# Patient Record
Sex: MALE | Race: AMERICAN INDIAN OR ALASKA NATIVE | URBAN - NONMETROPOLITAN AREA
[De-identification: names, ages, dates, MRNs, and addresses within clinical notes are randomized per-mention and may not be internally consistent; named-entity substitution may affect disease eponyms.]

---

## 2018-01-25 ENCOUNTER — DOCUMENTATION ONLY (OUTPATIENT)
Dept: FAMILY MEDICINE | Facility: OTHER | Age: 23
End: 2018-01-25

## 2018-01-25 RX ORDER — CLONIDINE HYDROCHLORIDE 0.1 MG/1
0.1 TABLET ORAL 2 TIMES DAILY
COMMUNITY
Start: 2015-02-10

## 2018-01-25 RX ORDER — BUPROPION HYDROCHLORIDE 150 MG/1
150 TABLET, EXTENDED RELEASE ORAL EVERY MORNING
COMMUNITY

## 2018-01-25 RX ORDER — NAPROXEN 500 MG/1
500 TABLET ORAL EVERY 12 HOURS PRN
COMMUNITY
Start: 2013-05-14

## 2018-01-25 RX ORDER — IBUPROFEN 200 MG
800 TABLET ORAL 4 TIMES DAILY
COMMUNITY

## 2018-01-25 RX ORDER — ONDANSETRON 4 MG/1
4 TABLET, ORALLY DISINTEGRATING ORAL EVERY 8 HOURS PRN
COMMUNITY
Start: 2015-02-10

## 2025-02-13 ENCOUNTER — HOSPITAL ENCOUNTER (INPATIENT)
Facility: OTHER | Age: 30
End: 2025-02-13
Attending: FAMILY MEDICINE | Admitting: INTERNAL MEDICINE
Payer: MEDICAID

## 2025-02-13 VITALS
DIASTOLIC BLOOD PRESSURE: 76 MMHG | BODY MASS INDEX: 22.69 KG/M2 | HEART RATE: 72 BPM | HEIGHT: 76 IN | OXYGEN SATURATION: 97 % | WEIGHT: 186.29 LBS | SYSTOLIC BLOOD PRESSURE: 113 MMHG | RESPIRATION RATE: 11 BRPM | TEMPERATURE: 98.6 F

## 2025-02-13 DIAGNOSIS — F15.929 METHAMPHETAMINE INTOXICATION (H): ICD-10-CM

## 2025-02-13 DIAGNOSIS — T33.90XA FROSTBITE, INITIAL ENCOUNTER: ICD-10-CM

## 2025-02-13 DIAGNOSIS — T69.9XXA COLD EXPOSURE, INITIAL ENCOUNTER: ICD-10-CM

## 2025-02-13 LAB
ALBUMIN SERPL BCG-MCNC: 4.9 G/DL (ref 3.5–5.2)
ALBUMIN UR-MCNC: 20 MG/DL
ALP SERPL-CCNC: 80 U/L (ref 40–150)
ALT SERPL W P-5'-P-CCNC: 88 U/L (ref 0–70)
AMPHETAMINES UR QL SCN: ABNORMAL
ANION GAP SERPL CALCULATED.3IONS-SCNC: 24 MMOL/L (ref 7–15)
APAP SERPL-MCNC: <5 UG/ML (ref 10–30)
APPEARANCE UR: CLEAR
AST SERPL W P-5'-P-CCNC: 244 U/L (ref 0–45)
ATRIAL RATE - MUSE: 92 BPM
BARBITURATES UR QL SCN: ABNORMAL
BASOPHILS # BLD AUTO: 0 10E3/UL (ref 0–0.2)
BASOPHILS NFR BLD AUTO: 0 %
BENZODIAZ UR QL SCN: ABNORMAL
BILIRUB SERPL-MCNC: 1.8 MG/DL
BILIRUB UR QL STRIP: NEGATIVE
BUN SERPL-MCNC: 40.1 MG/DL (ref 6–20)
BZE UR QL SCN: ABNORMAL
CALCIUM SERPL-MCNC: 9.4 MG/DL (ref 8.8–10.4)
CANNABINOIDS UR QL SCN: ABNORMAL
CHLORIDE SERPL-SCNC: 87 MMOL/L (ref 98–107)
CK SERPL-CCNC: 8710 U/L (ref 39–308)
COLOR UR AUTO: YELLOW
CREAT SERPL-MCNC: 1.31 MG/DL (ref 0.67–1.17)
DIASTOLIC BLOOD PRESSURE - MUSE: NORMAL MMHG
EGFRCR SERPLBLD CKD-EPI 2021: 75 ML/MIN/1.73M2
EOSINOPHIL # BLD AUTO: 0.1 10E3/UL (ref 0–0.7)
EOSINOPHIL NFR BLD AUTO: 1 %
ERYTHROCYTE [DISTWIDTH] IN BLOOD BY AUTOMATED COUNT: 12 % (ref 10–15)
ETHANOL SERPL-MCNC: <0.01 G/DL
FENTANYL UR QL: ABNORMAL
GLUCOSE SERPL-MCNC: 99 MG/DL (ref 70–99)
GLUCOSE UR STRIP-MCNC: NEGATIVE MG/DL
HCO3 SERPL-SCNC: 19 MMOL/L (ref 22–29)
HCT VFR BLD AUTO: 39.6 % (ref 40–53)
HGB BLD-MCNC: 14.6 G/DL (ref 13.3–17.7)
HGB UR QL STRIP: ABNORMAL
HOLD SPECIMEN: NORMAL
IMM GRANULOCYTES # BLD: 0.1 10E3/UL
IMM GRANULOCYTES NFR BLD: 0 %
INTERPRETATION ECG - MUSE: NORMAL
KETONES UR STRIP-MCNC: 40 MG/DL
LEUKOCYTE ESTERASE UR QL STRIP: NEGATIVE
LIPASE SERPL-CCNC: 13 U/L (ref 13–60)
LYMPHOCYTES # BLD AUTO: 0.6 10E3/UL (ref 0.8–5.3)
LYMPHOCYTES NFR BLD AUTO: 4 %
MAGNESIUM SERPL-MCNC: 1.7 MG/DL (ref 1.7–2.3)
MCH RBC QN AUTO: 31.9 PG (ref 26.5–33)
MCHC RBC AUTO-ENTMCNC: 36.9 G/DL (ref 31.5–36.5)
MCV RBC AUTO: 87 FL (ref 78–100)
MONOCYTES # BLD AUTO: 0.7 10E3/UL (ref 0–1.3)
MONOCYTES NFR BLD AUTO: 5 %
MUCOUS THREADS #/AREA URNS LPF: PRESENT /LPF
NEUTROPHILS # BLD AUTO: 13.1 10E3/UL (ref 1.6–8.3)
NEUTROPHILS NFR BLD AUTO: 90 %
NITRATE UR QL: NEGATIVE
NRBC # BLD AUTO: 0 10E3/UL
NRBC BLD AUTO-RTO: 0 /100
OPIATES UR QL SCN: ABNORMAL
P AXIS - MUSE: 46 DEGREES
PCP QUAL URINE (ROCHE): ABNORMAL
PH UR STRIP: 6 [PH] (ref 5–9)
PLATELET # BLD AUTO: 257 10E3/UL (ref 150–450)
POTASSIUM SERPL-SCNC: 4.2 MMOL/L (ref 3.4–5.3)
PR INTERVAL - MUSE: 148 MS
PROT SERPL-MCNC: 8.5 G/DL (ref 6.4–8.3)
QRS DURATION - MUSE: 96 MS
QT - MUSE: 404 MS
QTC - MUSE: 499 MS
R AXIS - MUSE: 26 DEGREES
RBC # BLD AUTO: 4.57 10E6/UL (ref 4.4–5.9)
RBC URINE: 4 /HPF
SODIUM SERPL-SCNC: 130 MMOL/L (ref 135–145)
SP GR UR STRIP: 1.02 (ref 1–1.03)
SPERM #/AREA URNS HPF: PRESENT /HPF
SYSTOLIC BLOOD PRESSURE - MUSE: NORMAL MMHG
T AXIS - MUSE: 33 DEGREES
UROBILINOGEN UR STRIP-MCNC: NORMAL MG/DL
VENTRICULAR RATE- MUSE: 92 BPM
WBC # BLD AUTO: 14.6 10E3/UL (ref 4–11)
WBC URINE: 2 /HPF

## 2025-02-13 PROCEDURE — 93010 ELECTROCARDIOGRAM REPORT: CPT | Performed by: INTERNAL MEDICINE

## 2025-02-13 PROCEDURE — 250N000011 HC RX IP 250 OP 636: Performed by: INTERNAL MEDICINE

## 2025-02-13 PROCEDURE — 96375 TX/PRO/DX INJ NEW DRUG ADDON: CPT | Performed by: FAMILY MEDICINE

## 2025-02-13 PROCEDURE — 83690 ASSAY OF LIPASE: CPT | Performed by: FAMILY MEDICINE

## 2025-02-13 PROCEDURE — 80053 COMPREHEN METABOLIC PANEL: CPT | Performed by: FAMILY MEDICINE

## 2025-02-13 PROCEDURE — 99291 CRITICAL CARE FIRST HOUR: CPT | Mod: 25 | Performed by: FAMILY MEDICINE

## 2025-02-13 PROCEDURE — 250N000009 HC RX 250: Performed by: INTERNAL MEDICINE

## 2025-02-13 PROCEDURE — 99285 EMERGENCY DEPT VISIT HI MDM: CPT | Performed by: FAMILY MEDICINE

## 2025-02-13 PROCEDURE — 96365 THER/PROPH/DIAG IV INF INIT: CPT | Performed by: FAMILY MEDICINE

## 2025-02-13 PROCEDURE — 93005 ELECTROCARDIOGRAM TRACING: CPT | Performed by: FAMILY MEDICINE

## 2025-02-13 PROCEDURE — 200N000001 HC R&B ICU

## 2025-02-13 PROCEDURE — 99223 1ST HOSP IP/OBS HIGH 75: CPT | Performed by: INTERNAL MEDICINE

## 2025-02-13 PROCEDURE — 90714 TD VACC NO PRESV 7 YRS+ IM: CPT | Performed by: FAMILY MEDICINE

## 2025-02-13 PROCEDURE — 82077 ASSAY SPEC XCP UR&BREATH IA: CPT | Performed by: FAMILY MEDICINE

## 2025-02-13 PROCEDURE — 85025 COMPLETE CBC W/AUTO DIFF WBC: CPT | Performed by: FAMILY MEDICINE

## 2025-02-13 PROCEDURE — 250N000011 HC RX IP 250 OP 636: Performed by: FAMILY MEDICINE

## 2025-02-13 PROCEDURE — 36415 COLL VENOUS BLD VENIPUNCTURE: CPT | Performed by: FAMILY MEDICINE

## 2025-02-13 PROCEDURE — 258N000003 HC RX IP 258 OP 636: Performed by: FAMILY MEDICINE

## 2025-02-13 PROCEDURE — 258N000003 HC RX IP 258 OP 636: Performed by: INTERNAL MEDICINE

## 2025-02-13 PROCEDURE — 80143 DRUG ASSAY ACETAMINOPHEN: CPT | Performed by: FAMILY MEDICINE

## 2025-02-13 PROCEDURE — 96367 TX/PROPH/DG ADDL SEQ IV INF: CPT | Performed by: FAMILY MEDICINE

## 2025-02-13 PROCEDURE — 90471 IMMUNIZATION ADMIN: CPT | Performed by: FAMILY MEDICINE

## 2025-02-13 PROCEDURE — 82040 ASSAY OF SERUM ALBUMIN: CPT | Performed by: FAMILY MEDICINE

## 2025-02-13 PROCEDURE — 96376 TX/PRO/DX INJ SAME DRUG ADON: CPT | Performed by: FAMILY MEDICINE

## 2025-02-13 PROCEDURE — 80307 DRUG TEST PRSMV CHEM ANLYZR: CPT | Performed by: FAMILY MEDICINE

## 2025-02-13 PROCEDURE — 81001 URINALYSIS AUTO W/SCOPE: CPT | Performed by: FAMILY MEDICINE

## 2025-02-13 PROCEDURE — 83735 ASSAY OF MAGNESIUM: CPT | Performed by: FAMILY MEDICINE

## 2025-02-13 PROCEDURE — 85014 HEMATOCRIT: CPT | Performed by: FAMILY MEDICINE

## 2025-02-13 PROCEDURE — 82550 ASSAY OF CK (CPK): CPT | Performed by: INTERNAL MEDICINE

## 2025-02-13 RX ORDER — NALOXONE HYDROCHLORIDE 0.4 MG/ML
0.2 INJECTION, SOLUTION INTRAMUSCULAR; INTRAVENOUS; SUBCUTANEOUS
Status: DISCONTINUED | OUTPATIENT
Start: 2025-02-13 | End: 2025-02-19 | Stop reason: HOSPADM

## 2025-02-13 RX ORDER — DEXMEDETOMIDINE HYDROCHLORIDE 4 UG/ML
.1-1.2 INJECTION, SOLUTION INTRAVENOUS CONTINUOUS
Status: DISCONTINUED | OUTPATIENT
Start: 2025-02-13 | End: 2025-02-13

## 2025-02-13 RX ORDER — MAGNESIUM SULFATE HEPTAHYDRATE 40 MG/ML
2 INJECTION, SOLUTION INTRAVENOUS ONCE
Status: COMPLETED | OUTPATIENT
Start: 2025-02-13 | End: 2025-02-13

## 2025-02-13 RX ORDER — LORAZEPAM 2 MG/ML
.5-1 INJECTION INTRAMUSCULAR EVERY 4 HOURS PRN
Status: DISCONTINUED | OUTPATIENT
Start: 2025-02-13 | End: 2025-02-17

## 2025-02-13 RX ORDER — LIDOCAINE 40 MG/G
CREAM TOPICAL
Status: DISCONTINUED | OUTPATIENT
Start: 2025-02-13 | End: 2025-02-19 | Stop reason: HOSPADM

## 2025-02-13 RX ORDER — PROCHLORPERAZINE MALEATE 10 MG
10 TABLET ORAL EVERY 6 HOURS PRN
Status: DISCONTINUED | OUTPATIENT
Start: 2025-02-13 | End: 2025-02-19 | Stop reason: HOSPADM

## 2025-02-13 RX ORDER — HYDROMORPHONE HYDROCHLORIDE 1 MG/ML
0.5 INJECTION, SOLUTION INTRAMUSCULAR; INTRAVENOUS; SUBCUTANEOUS
Status: DISCONTINUED | OUTPATIENT
Start: 2025-02-13 | End: 2025-02-17

## 2025-02-13 RX ORDER — ACETAMINOPHEN 650 MG/1
650 SUPPOSITORY RECTAL EVERY 4 HOURS PRN
Status: DISCONTINUED | OUTPATIENT
Start: 2025-02-13 | End: 2025-02-19

## 2025-02-13 RX ORDER — SODIUM CHLORIDE 9 MG/ML
INJECTION, SOLUTION INTRAVENOUS CONTINUOUS
Status: DISCONTINUED | OUTPATIENT
Start: 2025-02-13 | End: 2025-02-15

## 2025-02-13 RX ORDER — HEPARIN SODIUM 5000 [USP'U]/.5ML
5000 INJECTION, SOLUTION INTRAVENOUS; SUBCUTANEOUS EVERY 8 HOURS
Status: DISCONTINUED | OUTPATIENT
Start: 2025-02-13 | End: 2025-02-17

## 2025-02-13 RX ORDER — NALOXONE HYDROCHLORIDE 0.4 MG/ML
0.4 INJECTION, SOLUTION INTRAMUSCULAR; INTRAVENOUS; SUBCUTANEOUS
Status: DISCONTINUED | OUTPATIENT
Start: 2025-02-13 | End: 2025-02-19 | Stop reason: HOSPADM

## 2025-02-13 RX ORDER — POLYETHYLENE GLYCOL 3350 17 G/17G
17 POWDER, FOR SOLUTION ORAL 2 TIMES DAILY PRN
Status: DISCONTINUED | OUTPATIENT
Start: 2025-02-13 | End: 2025-02-19 | Stop reason: HOSPADM

## 2025-02-13 RX ORDER — ONDANSETRON 2 MG/ML
4 INJECTION INTRAMUSCULAR; INTRAVENOUS EVERY 6 HOURS PRN
Status: DISCONTINUED | OUTPATIENT
Start: 2025-02-13 | End: 2025-02-19 | Stop reason: HOSPADM

## 2025-02-13 RX ORDER — CEFTRIAXONE 1 G/1
1 INJECTION, POWDER, FOR SOLUTION INTRAMUSCULAR; INTRAVENOUS ONCE
Status: COMPLETED | OUTPATIENT
Start: 2025-02-13 | End: 2025-02-13

## 2025-02-13 RX ORDER — MAGNESIUM HYDROXIDE/ALUMINUM HYDROXICE/SIMETHICONE 120; 1200; 1200 MG/30ML; MG/30ML; MG/30ML
30 SUSPENSION ORAL EVERY 4 HOURS PRN
Status: DISCONTINUED | OUTPATIENT
Start: 2025-02-13 | End: 2025-02-19 | Stop reason: HOSPADM

## 2025-02-13 RX ORDER — AMOXICILLIN 250 MG
1 CAPSULE ORAL 2 TIMES DAILY PRN
Status: DISCONTINUED | OUTPATIENT
Start: 2025-02-13 | End: 2025-02-19 | Stop reason: HOSPADM

## 2025-02-13 RX ORDER — AMOXICILLIN 250 MG
2 CAPSULE ORAL 2 TIMES DAILY PRN
Status: DISCONTINUED | OUTPATIENT
Start: 2025-02-13 | End: 2025-02-19 | Stop reason: HOSPADM

## 2025-02-13 RX ORDER — CALCIUM CARBONATE 500 MG/1
1000 TABLET, CHEWABLE ORAL 4 TIMES DAILY PRN
Status: DISCONTINUED | OUTPATIENT
Start: 2025-02-13 | End: 2025-02-19 | Stop reason: HOSPADM

## 2025-02-13 RX ORDER — ACETAMINOPHEN 325 MG/1
650 TABLET ORAL EVERY 4 HOURS PRN
Status: DISCONTINUED | OUTPATIENT
Start: 2025-02-13 | End: 2025-02-19

## 2025-02-13 RX ORDER — LORAZEPAM 2 MG/ML
1 INJECTION INTRAMUSCULAR ONCE
Status: COMPLETED | OUTPATIENT
Start: 2025-02-13 | End: 2025-02-13

## 2025-02-13 RX ORDER — HYDROMORPHONE HYDROCHLORIDE 1 MG/ML
0.3 INJECTION, SOLUTION INTRAMUSCULAR; INTRAVENOUS; SUBCUTANEOUS EVERY 30 MIN PRN
Status: COMPLETED | OUTPATIENT
Start: 2025-02-13 | End: 2025-02-13

## 2025-02-13 RX ORDER — HYDROMORPHONE HYDROCHLORIDE 1 MG/ML
0.3 INJECTION, SOLUTION INTRAMUSCULAR; INTRAVENOUS; SUBCUTANEOUS EVERY 30 MIN PRN
Status: DISCONTINUED | OUTPATIENT
Start: 2025-02-13 | End: 2025-02-14

## 2025-02-13 RX ORDER — ONDANSETRON 4 MG/1
4 TABLET, ORALLY DISINTEGRATING ORAL EVERY 6 HOURS PRN
Status: DISCONTINUED | OUTPATIENT
Start: 2025-02-13 | End: 2025-02-19 | Stop reason: HOSPADM

## 2025-02-13 RX ORDER — DEXMEDETOMIDINE HYDROCHLORIDE 4 UG/ML
.1-1.2 INJECTION, SOLUTION INTRAVENOUS CONTINUOUS
Status: DISCONTINUED | OUTPATIENT
Start: 2025-02-13 | End: 2025-02-14

## 2025-02-13 RX ADMIN — CEFTRIAXONE SODIUM 1 G: 1 INJECTION, POWDER, FOR SOLUTION INTRAMUSCULAR; INTRAVENOUS at 11:36

## 2025-02-13 RX ADMIN — HEPARIN SODIUM 5000 UNITS: 5000 INJECTION, SOLUTION INTRAVENOUS; SUBCUTANEOUS at 22:14

## 2025-02-13 RX ADMIN — HYDROMORPHONE HYDROCHLORIDE 0.3 MG: 1 INJECTION, SOLUTION INTRAMUSCULAR; INTRAVENOUS; SUBCUTANEOUS at 11:36

## 2025-02-13 RX ADMIN — SODIUM CHLORIDE 1000 ML: 9 INJECTION, SOLUTION INTRAVENOUS at 11:26

## 2025-02-13 RX ADMIN — LORAZEPAM 1 MG: 2 INJECTION INTRAMUSCULAR; INTRAVENOUS at 16:39

## 2025-02-13 RX ADMIN — LORAZEPAM 1 MG: 2 INJECTION INTRAMUSCULAR; INTRAVENOUS at 13:53

## 2025-02-13 RX ADMIN — HYDROMORPHONE HYDROCHLORIDE 0.3 MG: 1 INJECTION, SOLUTION INTRAMUSCULAR; INTRAVENOUS; SUBCUTANEOUS at 14:53

## 2025-02-13 RX ADMIN — DEXMEDETOMIDINE HYDROCHLORIDE 0.2 MCG/KG/HR: 4 INJECTION, SOLUTION INTRAVENOUS at 15:44

## 2025-02-13 RX ADMIN — SODIUM CHLORIDE: 0.9 INJECTION, SOLUTION INTRAVENOUS at 15:41

## 2025-02-13 RX ADMIN — HYDROMORPHONE HYDROCHLORIDE 0.3 MG: 1 INJECTION, SOLUTION INTRAMUSCULAR; INTRAVENOUS; SUBCUTANEOUS at 13:33

## 2025-02-13 RX ADMIN — CLOSTRIDIUM TETANI TOXOID ANTIGEN (FORMALDEHYDE INACTIVATED) AND CORYNEBACTERIUM DIPHTHERIAE TOXOID ANTIGEN (FORMALDEHYDE INACTIVATED) 0.5 ML: 5; 2 INJECTION, SUSPENSION INTRAMUSCULAR at 11:25

## 2025-02-13 RX ADMIN — DEXMEDETOMIDINE HYDROCHLORIDE 0.6 MCG/KG/HR: 4 INJECTION, SOLUTION INTRAVENOUS at 22:53

## 2025-02-13 RX ADMIN — HYDROMORPHONE HYDROCHLORIDE 0.3 MG: 1 INJECTION, SOLUTION INTRAMUSCULAR; INTRAVENOUS; SUBCUTANEOUS at 13:53

## 2025-02-13 RX ADMIN — HYDROMORPHONE HYDROCHLORIDE 0.5 MG: 1 INJECTION, SOLUTION INTRAMUSCULAR; INTRAVENOUS; SUBCUTANEOUS at 15:34

## 2025-02-13 RX ADMIN — MAGNESIUM SULFATE HEPTAHYDRATE 2 G: 2 INJECTION, SOLUTION INTRAVENOUS at 12:12

## 2025-02-13 RX ADMIN — HYDROMORPHONE HYDROCHLORIDE 0.3 MG: 1 INJECTION, SOLUTION INTRAMUSCULAR; INTRAVENOUS; SUBCUTANEOUS at 12:11

## 2025-02-13 ASSESSMENT — COLUMBIA-SUICIDE SEVERITY RATING SCALE - C-SSRS
1. IN THE PAST MONTH, HAVE YOU WISHED YOU WERE DEAD OR WISHED YOU COULD GO TO SLEEP AND NOT WAKE UP?: NO
6. HAVE YOU EVER DONE ANYTHING, STARTED TO DO ANYTHING, OR PREPARED TO DO ANYTHING TO END YOUR LIFE?: NO
2. HAVE YOU ACTUALLY HAD ANY THOUGHTS OF KILLING YOURSELF IN THE PAST MONTH?: NO

## 2025-02-13 ASSESSMENT — ACTIVITIES OF DAILY LIVING (ADL)
ADLS_ACUITY_SCORE: 17
ADLS_ACUITY_SCORE: 41
ADLS_ACUITY_SCORE: 15
ADLS_ACUITY_SCORE: 17
ADLS_ACUITY_SCORE: 41

## 2025-02-13 ASSESSMENT — ENCOUNTER SYMPTOMS
FEVER: 0
HEADACHES: 0
CHILLS: 0
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
## 2025-02-13 NOTE — PROGRESS NOTES
Tele ICU Note        Reviewed chart of this patient        This is a 29 year old male with history of methamphetamine use, who presented with a frost bite to both feet that happened 5 days ago. Patient was admitted to the ICU for further care.      - Frost bite to both feet: rewarming as needed, anelgesia, surgical and wound consults. Agree with CK check and IVF.   - Methamphetamine use: symptomatic management, benzos as needed for agitation and BP management as needed     No additional recommendations at this time. For any questions please call Tele-ICU hub at [phone]     Fidel Menard MD

## 2025-02-13 NOTE — PROGRESS NOTES
:    Patient is going to be admitted to hospital.  It appears patient may not have any insurance. Spoke with ED registration and they are looking into it.  Handoff given to impatient social workers.    [facility] on 2/13/2025 at 2:06 PM
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
## 2025-02-13 NOTE — PHARMACY-ADMISSION MEDICATION HISTORY
Pharmacist Admission Medication History    Admission medication history is complete. The information provided in this note is only as accurate as the sources available at the time of the update.    Information Source(s): Patient, CareEverywhere/SureScripts, and   via in-person    Pertinent Information: Patient is a good historian. Patient stated that his not taking any medication (prescription or otherwise) at this time other than ibuprofen. Patient stated that he was using Suboxone 12 mg-3 mg twice daily (last fill date 12/11/24 for 60 films), but stopped 1-2 weeks ago due to illicit drug use. Patient was consistently filling Suboxone 12 mg-3 mg twice daily since 06/25/24 to 12/11/24 per Surescripts and .  Patient stated that he does have Suboxone at home still.    Changes made to PTA medication list:  Added: None  Deleted:   Bupropion 150 mg SR  No recent fill history in the past year  Naproxen  Ondansetron  No recent fill history in the past year  Changed: None    Allergies reviewed with patient and updates made in EHR: yes    Medication History Completed By: Cedric Franklin RPH 2/13/2025 2:04 PM    No outpatient medications have been marked as taking for the 2/13/25 encounter (Hospital Encounter).

## 2025-02-13 NOTE — ED TRIAGE NOTES
Pt here via EMS from detox after pt walked 8 miles trying to get treatment. 5 days ago first got frost bite. was seen at [facility] for frost bite on bilateral feet yesterday. He has since than walked in externe cold in tennis shoes today. His feet are black/ purple blistered and painful. EMS has given a  total of 100 mcg fentanyl and 1mg IV dilaudid. Pt is in 10/10 pain. MD at bedside. HX of MRSA    Pt is homeless and also unable to tell if he has ever had alcohol withdrawl     Triage Assessment (Adult)       Row Name 02/13/25 1107          Triage Assessment    Airway WDL WDL        Respiratory WDL    Respiratory WDL WDL        Skin Circulation/Temperature WDL    Skin Circulation/Temperature WDL X;all     Skin Circulation cyanosis     Skin Temperature cool        Cardiac WDL    Cardiac WDL WDL        Peripheral/Neurovascular WDL    Peripheral Neurovascular WDL X;capillary refill;neurovascular assessment lower     Capillary Refill, LUE less than/equal to 3 secs     Capillary Refill, RUE less than/equal to 3 secs     Capillary Refill, LLE greater than 3 secs     Capillary Refill, RLE greater than 3 secs        Cognitive/Neuro/Behavioral WDL    Cognitive/Neuro/Behavioral WDL WDL        LLE Neurovascular Assessment    Temperature LLE cold     Color LLE black     Sensation LLE tenderness present;sensation absent;numbness present        RLE Neurovascular Assessment    Temperature RLE cold     Color RLE black     Sensation RLE tenderness present;sensation absent;numbness present

## 2025-02-13 NOTE — PROGRESS NOTES
"[facility] ADMISSION NOTE    Patient admitted to room 915 at approximately 1450 via wheel chair from emergency room. Patient was accompanied by nurse.     Verbal SBAR report received from STEPHANIE Medina prior to patient arrival.     Patient ambulated to bed with stand-by assist. Patient alert and oriented X 3. Pain is not well controlled.  Medication(s) being used: narcotic analgesics including hydromorphone (Dilaudid).  . Admission vital signs: Blood pressure (!) 144/90, pulse 103, temperature 100  F (37.8  C), temperature source Tympanic, resp. rate 20, height 1.93 m (6' 4\"), weight 95.3 kg (210 lb), SpO2 97%. Patient was oriented to plan of care, call light, bed controls, tv, telephone, bathroom, and visiting hours.     Risk Assessment    The following safety risks were identified during admission: fall and skin. Yellow risk band applied: YES.     Skin Initial Assessment    This writer admitted this patient and completed a full skin assessment and Bryant score in the Adult PCS flowsheet.   Photo documentation of skin problem and/or wound competed via [employer] application (located under Media):  Yes    Appropriate interventions initiated as needed.     Secondary skin check completed by STEPHANIE Medina.         Education    Patient has a [place] to Observation order: No  Observation education completed and documented: N/A    Admission/Transfer from: ER  2 RN skin assessment completed. Yes  Significant findings include: Severe frostbite to bilateral feet   [facility] Nurse Consult Ordered? Yes       Marva Reyes RN      "

## 2025-02-14 LAB
ALBUMIN SERPL BCG-MCNC: 3.8 G/DL (ref 3.5–5.2)
ALP SERPL-CCNC: 62 U/L (ref 40–150)
ALT SERPL W P-5'-P-CCNC: 72 U/L (ref 0–70)
ANION GAP SERPL CALCULATED.3IONS-SCNC: 14 MMOL/L (ref 7–15)
AST SERPL W P-5'-P-CCNC: 162 U/L (ref 0–45)
BILIRUB SERPL-MCNC: 1.3 MG/DL
BUN SERPL-MCNC: 25.9 MG/DL (ref 6–20)
CALCIUM SERPL-MCNC: 8.6 MG/DL (ref 8.8–10.4)
CHLORIDE SERPL-SCNC: 96 MMOL/L (ref 98–107)
CK SERPL-CCNC: 3895 U/L (ref 39–308)
CREAT SERPL-MCNC: 0.77 MG/DL (ref 0.67–1.17)
EGFRCR SERPLBLD CKD-EPI 2021: >90 ML/MIN/1.73M2
ERYTHROCYTE [DISTWIDTH] IN BLOOD BY AUTOMATED COUNT: 12.3 % (ref 10–15)
GLUCOSE SERPL-MCNC: 108 MG/DL (ref 70–99)
HCO3 SERPL-SCNC: 24 MMOL/L (ref 22–29)
HCT VFR BLD AUTO: 34.6 % (ref 40–53)
HGB BLD-MCNC: 12.3 G/DL (ref 13.3–17.7)
MCH RBC QN AUTO: 32 PG (ref 26.5–33)
MCHC RBC AUTO-ENTMCNC: 35.5 G/DL (ref 31.5–36.5)
MCV RBC AUTO: 90 FL (ref 78–100)
PLATELET # BLD AUTO: 198 10E3/UL (ref 150–450)
POTASSIUM SERPL-SCNC: 3.9 MMOL/L (ref 3.4–5.3)
PROT SERPL-MCNC: 6.7 G/DL (ref 6.4–8.3)
RBC # BLD AUTO: 3.84 10E6/UL (ref 4.4–5.9)
SODIUM SERPL-SCNC: 134 MMOL/L (ref 135–145)
WBC # BLD AUTO: 9 10E3/UL (ref 4–11)

## 2025-02-14 PROCEDURE — 99232 SBSQ HOSP IP/OBS MODERATE 35: CPT | Performed by: SURGERY

## 2025-02-14 PROCEDURE — 36415 COLL VENOUS BLD VENIPUNCTURE: CPT | Performed by: INTERNAL MEDICINE

## 2025-02-14 PROCEDURE — 10140 I&D HMTMA SEROMA/FLUID COLLJ: CPT | Performed by: NURSE PRACTITIONER

## 2025-02-14 PROCEDURE — 250N000013 HC RX MED GY IP 250 OP 250 PS 637: Performed by: INTERNAL MEDICINE

## 2025-02-14 PROCEDURE — 87205 SMEAR GRAM STAIN: CPT | Performed by: NURSE PRACTITIONER

## 2025-02-14 PROCEDURE — 0H9MXZZ DRAINAGE OF RIGHT FOOT SKIN, EXTERNAL APPROACH: ICD-10-PCS | Performed by: NURSE PRACTITIONER

## 2025-02-14 PROCEDURE — 99232 SBSQ HOSP IP/OBS MODERATE 35: CPT | Mod: 25 | Performed by: INTERNAL MEDICINE

## 2025-02-14 PROCEDURE — 85027 COMPLETE CBC AUTOMATED: CPT | Performed by: INTERNAL MEDICINE

## 2025-02-14 PROCEDURE — 84460 ALANINE AMINO (ALT) (SGPT): CPT | Performed by: INTERNAL MEDICINE

## 2025-02-14 PROCEDURE — 250N000011 HC RX IP 250 OP 636: Performed by: INTERNAL MEDICINE

## 2025-02-14 PROCEDURE — 82435 ASSAY OF BLOOD CHLORIDE: CPT | Performed by: INTERNAL MEDICINE

## 2025-02-14 PROCEDURE — 82947 ASSAY GLUCOSE BLOOD QUANT: CPT | Performed by: INTERNAL MEDICINE

## 2025-02-14 PROCEDURE — 87070 CULTURE OTHR SPECIMN AEROBIC: CPT | Performed by: NURSE PRACTITIONER

## 2025-02-14 PROCEDURE — 200N000001 HC R&B ICU

## 2025-02-14 PROCEDURE — 82550 ASSAY OF CK (CPK): CPT | Performed by: INTERNAL MEDICINE

## 2025-02-14 PROCEDURE — 85041 AUTOMATED RBC COUNT: CPT | Performed by: INTERNAL MEDICINE

## 2025-02-14 PROCEDURE — 99232 SBSQ HOSP IP/OBS MODERATE 35: CPT | Mod: 25 | Performed by: NURSE PRACTITIONER

## 2025-02-14 RX ORDER — GABAPENTIN 300 MG/1
300 CAPSULE ORAL 3 TIMES DAILY
Status: DISCONTINUED | OUTPATIENT
Start: 2025-02-14 | End: 2025-02-19 | Stop reason: HOSPADM

## 2025-02-14 RX ORDER — NICOTINE 21 MG/24HR
1 PATCH, TRANSDERMAL 24 HOURS TRANSDERMAL DAILY
Status: DISCONTINUED | OUTPATIENT
Start: 2025-02-14 | End: 2025-02-19 | Stop reason: HOSPADM

## 2025-02-14 RX ORDER — OXYCODONE HYDROCHLORIDE 5 MG/1
5 TABLET ORAL EVERY 4 HOURS PRN
Status: DISCONTINUED | OUTPATIENT
Start: 2025-02-14 | End: 2025-02-19

## 2025-02-14 RX ADMIN — SODIUM CHLORIDE: 0.9 INJECTION, SOLUTION INTRAVENOUS at 19:32

## 2025-02-14 RX ADMIN — HEPARIN SODIUM 5000 UNITS: 5000 INJECTION, SOLUTION INTRAVENOUS; SUBCUTANEOUS at 05:44

## 2025-02-14 RX ADMIN — NICOTINE 1 PATCH: 21 PATCH, EXTENDED RELEASE TRANSDERMAL at 17:10

## 2025-02-14 RX ADMIN — HYDROMORPHONE HYDROCHLORIDE 0.5 MG: 1 INJECTION, SOLUTION INTRAMUSCULAR; INTRAVENOUS; SUBCUTANEOUS at 18:37

## 2025-02-14 RX ADMIN — HYDROMORPHONE HYDROCHLORIDE 0.5 MG: 1 INJECTION, SOLUTION INTRAMUSCULAR; INTRAVENOUS; SUBCUTANEOUS at 05:43

## 2025-02-14 RX ADMIN — NICOTINE 1 PATCH: 21 PATCH, EXTENDED RELEASE TRANSDERMAL at 09:47

## 2025-02-14 RX ADMIN — OXYCODONE HYDROCHLORIDE 5 MG: 5 TABLET ORAL at 19:32

## 2025-02-14 RX ADMIN — GABAPENTIN 300 MG: 300 CAPSULE ORAL at 22:03

## 2025-02-14 RX ADMIN — HYDROMORPHONE HYDROCHLORIDE 0.5 MG: 1 INJECTION, SOLUTION INTRAMUSCULAR; INTRAVENOUS; SUBCUTANEOUS at 11:09

## 2025-02-14 RX ADMIN — LORAZEPAM 1 MG: 2 INJECTION INTRAMUSCULAR; INTRAVENOUS at 19:49

## 2025-02-14 RX ADMIN — HYDROMORPHONE HYDROCHLORIDE 0.5 MG: 1 INJECTION, SOLUTION INTRAMUSCULAR; INTRAVENOUS; SUBCUTANEOUS at 01:05

## 2025-02-14 RX ADMIN — SODIUM CHLORIDE: 0.9 INJECTION, SOLUTION INTRAVENOUS at 09:51

## 2025-02-14 RX ADMIN — HEPARIN SODIUM 5000 UNITS: 5000 INJECTION, SOLUTION INTRAVENOUS; SUBCUTANEOUS at 22:03

## 2025-02-14 RX ADMIN — HYDROMORPHONE HYDROCHLORIDE 0.5 MG: 1 INJECTION, SOLUTION INTRAMUSCULAR; INTRAVENOUS; SUBCUTANEOUS at 08:34

## 2025-02-14 RX ADMIN — OXYCODONE HYDROCHLORIDE 5 MG: 5 TABLET ORAL at 09:47

## 2025-02-14 RX ADMIN — GABAPENTIN 300 MG: 300 CAPSULE ORAL at 09:47

## 2025-02-14 RX ADMIN — HEPARIN SODIUM 5000 UNITS: 5000 INJECTION, SOLUTION INTRAVENOUS; SUBCUTANEOUS at 14:51

## 2025-02-14 RX ADMIN — GABAPENTIN 300 MG: 300 CAPSULE ORAL at 14:51

## 2025-02-14 RX ADMIN — OXYCODONE HYDROCHLORIDE 5 MG: 5 TABLET ORAL at 14:50

## 2025-02-14 ASSESSMENT — ACTIVITIES OF DAILY LIVING (ADL)
ADLS_ACUITY_SCORE: 22
ADLS_ACUITY_SCORE: 17
ADLS_ACUITY_SCORE: 22
ADLS_ACUITY_SCORE: 22
ADLS_ACUITY_SCORE: 17
ADLS_ACUITY_SCORE: 17
ADLS_ACUITY_SCORE: 18
ADLS_ACUITY_SCORE: 17
ADLS_ACUITY_SCORE: 22
ADLS_ACUITY_SCORE: 17
ADLS_ACUITY_SCORE: 22
ADLS_ACUITY_SCORE: 17
ADLS_ACUITY_SCORE: 22
ADLS_ACUITY_SCORE: 17
ADLS_ACUITY_SCORE: 22
ADLS_ACUITY_SCORE: 22
ADLS_ACUITY_SCORE: 18
ADLS_ACUITY_SCORE: 22

## 2025-02-14 NOTE — PLAN OF CARE
Problem: Pain Acute  Goal: Optimal Pain Control and Function  Intervention: Prevent or Manage Pain  Recent Flowsheet Documentation  Taken 2/14/2025 0000 by Tres Bowen RN  Sensory Stimulation Regulation:   care clustered   quiet environment promoted  Medication Review/Management: medications reviewed  Taken 2/13/2025 2000 by Tres Bowen RN  Sensory Stimulation Regulation:   care clustered   quiet environment promoted  Medication Review/Management: medications reviewed   Goal Outcome Evaluation:    Patient admitted for frostbite to BLEs with concern for polysubstance withdraw. Patient on precedex drip for duration of shift. Upon initial assessment patient was RASS -2 with minimal efforts to get out of bed or remove lines and not using communication to express needs appropriately. Patient getting out of bed on one occasion due to need to urinate. Patient was able to use urinal at bedside appropriately and returned to bed after. Patient reported no pain at this time. Patient did complain of pain on two occasions in his feet and requested medication, pain was relieved with medication (see MAR for more details).      WDL

## 2025-02-14 NOTE — PLAN OF CARE
"Goal Outcome Evaluation:  Pt is A&OX4. Has been restless/anxious throughout shift. Precedex gtt infusing at 0.6 mcg/kg/hr. PRN ativan utilized as well with effect. VSS. HR SR. Afebrile.     Pt has frost bite to bilateral feet. Feet are black/purple with scattered serous filled blisters and areas of skin shearing off. Cleansed with wound cleanser. Open areas covered with non-adherents and feet wrapped in kerlix. 10/10 pain to feet. PRN Dilaudid utilized. Pt has not voided since admission to ICU. Pt attempted 1X. Pt refused to be bladder scanned at this time. Denies any discomfort.        Plan of Care Reviewed With: patient    Overall Patient Progress: no changeOverall Patient Progress: no change         Problem: Adult Inpatient Plan of Care  Goal: Plan of Care Review  Description: The Plan of Care Review/Shift note should be completed every shift.  The Outcome Evaluation is a brief statement about your assessment that the patient is improving, declining, or no change.  This information will be displayed automatically on your shift  note.  Outcome: Not Progressing  Flowsheets (Taken 2/13/2025 1837)  Plan of Care Reviewed With: patient  Overall Patient Progress: no change  Goal: Patient-Specific Goal (Individualized)  Description: You can add care plan individualizations to a care plan. Examples of Individualization might be:  \"Parent requests to be called daily at 9am for status\", \"I have a hard time hearing out of my right ear\", or \"Do not touch me to wake me up as it startles  me\".  Outcome: Not Progressing  Goal: Absence of Hospital-Acquired Illness or Injury  Outcome: Not Progressing  Intervention: Identify and Manage Fall Risk  Recent Flowsheet Documentation  Taken 2/13/2025 1534 by Marva Reyes, RN  Safety Promotion/Fall Prevention:   activity supervised   increased rounding and observation   room door open   room near nurse's station  Intervention: Prevent Skin Injury  Recent Flowsheet Documentation  Taken " 2/13/2025 1534 by Marva Reyes, RN  Body Position:   position changed independently   weight shifting  Goal: Optimal Comfort and Wellbeing  Outcome: Not Progressing  Intervention: Provide Person-Centered Care  Recent Flowsheet Documentation  Taken 2/13/2025 1534 by Marva Reyes, RN  Trust Relationship/Rapport:   care explained   choices provided   questions encouraged   reassurance provided  Goal: Readiness for Transition of Care  Outcome: Not Progressing  Intervention: Mutually Develop Transition Plan  Recent Flowsheet Documentation  Taken 2/13/2025 1500 by Marva Reyes, RN  Equipment Currently Used at Home: none

## 2025-02-14 NOTE — PROGRESS NOTES
Interdisciplinary Discharge Planning Note    Anticipated Discharge Date: Multiple days    Anticipated Discharge Location: TBD    Clinical Needs Before Discharge:   Pain control, wound care, surg consult    Treatment Needs After Discharge:   Waiting to determine patient's needs     Potential Barriers to Discharge: Patient has a complex social history     GE Atkinson  2/14/2025,  1:20 PM

## 2025-02-14 NOTE — PROGRESS NOTES
[facility]    Hospitalist Progress Note      Assessment & Plan   Pablo Gonzalez is a 29 year old male who was admitted on 2/13/2025.     Clinically Significant Risk Factors Present on Admission         # Hyponatremia: Lowest Na = 130 mmol/L in last 2 days, will monitor as appropriate  # Hypochloremia: Lowest Cl = 87 mmol/L in last 2 days, will monitor as appropriate  # Hypocalcemia: Lowest Ca = 8.6 mg/dL in last 2 days, will monitor and replace as appropriate    # Anion Gap Metabolic Acidosis: Highest Anion Gap = 24 mmol/L in last 2 days, will monitor and treat as appropriate                            Principal Problem:    Frostbite, initial encounter    Cold exposure, initial encounter    Assessment: Bilateral feet occurred over 5 days ago, ED provider initially contacted burn unit felt he was too late in the course regarding further interventions.  Continuing with aggressive supportive cares.    Plan:   -Trend CK continue IV fluids  -Appreciate WOC consult  -Appreciate general surgery consult  -Aggressive pain control with as needed Dilaudid and oxycodone  -Start scheduled Neurontin      Methamphetamine intoxication (H)    Polysubstance abuse (H)    Tobacco abuse    Assessment: Significantly improved and now weaned off of Precedex, very tearful and regretful and wanting to make changes.    Plan:   -Ativan as needed  -nicotine patch  -Proceed social work involvement for coming up needs    Diet: Combination Diet Regular Diet Adult    DVT Prophylaxis: Heparin SQ  Holt Catheter: Not present  Code Status: Full Code           Disposition Plan     Expected Discharge Date: 02/15/2025             Entered: Ed Gordillo MD 02/14/2025, 10:57 AM       The patient's care was discussed with the Bedside Nurse and Patient.    Ed Gordillo MD  Hospitalist Service  [facility]  Contact information available via [facility]  Destinee/y    ______________________________________________________________________    Interval History   CC: Frostbite    Overnight no acute events, pain remains severe with a burning feels like he has a blow torch on the bottom of his feet with significant paresthesias.  No other headaches vision changes hallucinations or tremors, no shortness of breath cough nausea vomiting diarrhea.  He is appreciative of having regular consistency diet, wanting to try and get some rest and eager to looking into chemical dependency treatment options prior to discharge.    -Data reviewed today: I reviewed all new labs and imaging results over the last 24 hours.     Physical Exam   Temp: 98.3  F (36.8  C) Temp src: Tympanic BP: 125/82 Pulse: 73   Resp: 14 SpO2: 98 % O2 Device: None (Room air)    Vitals:    02/13/25 1122 02/13/25 1539   Weight: 95.3 kg (210 lb) 84.5 kg (186 lb 4.6 oz)     Vital Signs with Ranges  Temp:  [98.3  F (36.8  C)-100  F (37.8  C)] 98.3  F (36.8  C)  Pulse:  [] 73  Resp:  [11-27] 14  BP: ()/() 125/82  SpO2:  [94 %-99 %] 98 %  I/O last 3 completed shifts:  In: 2198 [P.O.:600; I.V.:1598]  Out: 475 [Urine:475]    GENERAL: Talkative, sitting up in bed appropriate and pleasant, in no apparent distress.  CARDIOVASCULAR: regular rate and rhythm, no murmurs, rubs, or gallops. Normal S1/S2. No lower extremity edema.   RESPIRATORY: clear to auscultation bilaterally, no wheezes, no crackles.   GI: soft, non-tender, non-distended, normoactive bowel sounds.  MUSCULOSKELETAL: warm and well perfused, 2+ dorsalis pedis pulses bilaterally.    SKIN: Bilateral feet wrapped with bulky Kerlix dressing, clean dry and intact.  Neuro: Awake alert and oriented x 3, upper extremity tremor, intermittently impulsive, tracking.    Medications   Current Facility-Administered Medications   Medication Dose Route Frequency Provider Last Rate Last Admin    sodium chloride 0.9 % infusion   Intravenous Continuous  Maxx Hwang  mL/hr at 02/14/25 0951 New Bag at 02/14/25 0951     Current Facility-Administered Medications   Medication Dose Route Frequency Provider Last Rate Last Admin    gabapentin (NEURONTIN) capsule 300 mg  300 mg Oral TID Ed Gordillo MD   300 mg at 02/14/25 0947    heparin ANTICOAGULANT injection 5,000 Units  5,000 Units Subcutaneous Q8H Maxx Hwang MD   5,000 Units at 02/14/25 0544    nicotine (NICODERM CQ) 21 MG/24HR 24 hr patch 1 patch  1 patch Transdermal Daily Ed Gordillo MD   1 patch at 02/14/25 0947    sodium chloride (PF) 0.9% PF flush 3 mL  3 mL Intracatheter Q8H Maxx Hwang MD           Data   Recent Labs   Lab 02/14/25  0549 02/13/25  1109   WBC 9.0 14.6*   HGB 12.3* 14.6   MCV 90 87    257   * 130*   POTASSIUM 3.9 4.2   CHLORIDE 96* 87*   CO2 24 19*   BUN 25.9* 40.1*   CR 0.77 1.31*   ANIONGAP 14 24*   DA 8.6* 9.4   * 99   ALBUMIN 3.8 4.9   PROTTOTAL 6.7 8.5*   BILITOTAL 1.3* 1.8*   ALKPHOS 62 80   ALT 72* 88*   * 244*   LIPASE  --  13       No results found for this or any previous visit (from the past 24 hours).

## 2025-02-14 NOTE — CONSULTS
[facility]  WOC CONSULT    Assessment:     ICD-10-CM    1. Frostbite, initial encounter  T33.90XA       2. Cold exposure, initial encounter  T69.9XXA       3. Methamphetamine intoxication (H)  F15.379           Plan:   -Dressing change daily.  Cleanse frostbite areas with Anasept.  Thread Vaseline gauze in between toes then lay Vaseline gauze over tips of toes to ensure all toes are covered.  Apply ABD pad under all areas of frostbite injury.  Wrap with gauze roll and secured with Medipore tape.  -Float both feet to prevent pressure.  -Wound culture was obtained as patient insisted on having this completed when wounds were surgically drained.  He has past history of MRSA.    Subjective:  This is a WOC consult as requested by Dr. Hwang of a 29 year old male patient with a frostbite injury to both feet.  He complains of significant pain that is worse when areas are not wrapped.  He is very anxious.  Apprehensive and becomes mildly agitated as he was looking for his wallet.    No past medical history on file.  No past surgical history on file.  Patient has no known allergies.  Current Facility-Administered Medications   Medication Dose Route Frequency Provider Last Rate Last Admin    acetaminophen (TYLENOL) tablet 650 mg  650 mg Oral Q4H PRN Maxx Hwang MD        Or    acetaminophen (TYLENOL) Suppository 650 mg  650 mg Rectal Q4H PRN Maxx Hwang MD        alum & mag hydroxide-simethicone (MAALOX) suspension 30 mL  30 mL Oral Q4H PRN Maxx Hwang MD        calcium carbonate (TUMS) chewable tablet 1,000 mg  1,000 mg Oral 4x Daily PRN Maxx Hwang MD        dexmedeTOMIDine (PRECEDEX) 4 mcg/mL in sodium chloride 0.9 % 100 mL infusion  0.1-1.2 mcg/kg/hr Intravenous Continuous Maxx Hwang MD   Paused at 02/14/25 0414    heparin ANTICOAGULANT injection 5,000 Units  5,000 Units Subcutaneous Q8H Maxx Hwang MD   5,000 Units at 02/14/25 0544    HYDROmorphone (PF) (DILAUDID) injection 0.3 mg   0.3 mg Intravenous Q30 Min PRN Maxx Hwang MD   0.3 mg at 02/13/25 1453    HYDROmorphone (PF) (DILAUDID) injection 0.5 mg  0.5 mg Intravenous Q2H PRN Maxx Hwang MD   0.5 mg at 02/14/25 0543    lidocaine (LMX4) cream   Topical Q1H PRN Maxx Hwang MD        lidocaine 1 % 0.1-1 mL  0.1-1 mL Other Q1H PRN Maxx Hwang MD        LORazepam (ATIVAN) injection 0.5-1 mg  0.5-1 mg Intravenous Q4H PRN Maxx Hwang MD   1 mg at 02/13/25 1639    naloxone (NARCAN) injection 0.2 mg  0.2 mg Intravenous Q2 Min PRN Maxx Hwang MD        Or    naloxone (NARCAN) injection 0.4 mg  0.4 mg Intravenous Q2 Min PRN Maxx Hwang MD        Or    naloxone (NARCAN) injection 0.2 mg  0.2 mg Intramuscular Q2 Min PRN Maxx Hwang MD        Or    naloxone (NARCAN) injection 0.4 mg  0.4 mg Intramuscular Q2 Min PRN Maxx Hwang MD        ondansetron (ZOFRAN ODT) ODT tab 4 mg  4 mg Oral Q6H PRN Maxx Hwang MD        Or    ondansetron (ZOFRAN) injection 4 mg  4 mg Intravenous Q6H PRMaxx Estrada MD        polyethylene glycol (MIRALAX) Packet 17 g  17 g Oral BID PRN Maxx Hwang MD        prochlorperazine (COMPAZINE) injection 10 mg  10 mg Intravenous Q6H PRMaxx Estrada MD        Or    prochlorperazine (COMPAZINE) tablet 10 mg  10 mg Oral Q6H PRMaxx Estrada MD        senna-docusate (SENOKOT-S/PERICOLACE) 8.6-50 MG per tablet 1 tablet  1 tablet Oral BID PRMaxx Estrada MD        Or    senna-docusate (SENOKOT-S/PERICOLACE) 8.6-50 MG per tablet 2 tablet  2 tablet Oral BID PRMaxx Estrada MD        sodium chloride (PF) 0.9% PF flush 3 mL  3 mL Intracatheter Q8H Maxx Hwang MD        sodium chloride (PF) 0.9% PF flush 3 mL  3 mL Intracatheter q1 min prn Maxx Hwang MD   3 mL at 02/13/25 1639    sodium chloride 0.9 % infusion   Intravenous Continuous Maxx Hwang  mL/hr at 02/13/25 1541 New Bag at 02/13/25 1541       Review of  "Systems:  No fever, chills    Objective:   /56   Pulse 68   Temp 98.3  F (36.8  C) (Tympanic)   Resp 16   Ht 1.93 m (6' 4\")   Wt 84.5 kg (186 lb 4.6 oz)   SpO2 98%   BMI 22.68 kg/m    Physical Exam     Anxious, apprehensive, mildly agitated at certain times during evaluation.  Extensive frostbite injury to digits of both feet, plantar surface of both feet and left heel with large ruptured blister.  Right foot frostbite injury with deeper tissue damage than left foot.  There are several areas of blistering on the right foot and dorsal surface second digit left foot.  All skin surfaces of both feet are cleansed with Anasept.  Timeout was completed.  After permission granted by patient, 15 blade scalpel used to rupture blisters.  These drained serous to light pink serous drainage.  No discomfort experienced by patient.  Vaseline gauze threaded between toes of both feet with Vaseline gauze laying over tips of toes and covering any exposed surfaces.  ABD pads applied plantar surface and heels followed by gauze roll and secured with Medipore tape.      Comorbid Conditions Or Complications To Healing:  History of polysubstance abuse, history of MRSA    Nutritional Status:  Normal    Labs:  Recent Results (from the past 240 hours)   Extra Blue Top Tube    Collection Time: 02/13/25 11:09 AM   Result Value Ref Range    Hold Specimen JIC    Extra Red Top Tube    Collection Time: 02/13/25 11:09 AM   Result Value Ref Range    Hold Specimen JIC    Extra Green Top (Lithium Heparin) Tube    Collection Time: 02/13/25 11:09 AM   Result Value Ref Range    Hold Specimen JIC    Extra Purple Top Tube    Collection Time: 02/13/25 11:09 AM   Result Value Ref Range    Hold Specimen JIC    Extra Green Top (Lithium Heparin) ON ICE    Collection Time: 02/13/25 11:09 AM   Result Value Ref Range    Hold Specimen JIC    Comprehensive metabolic panel    Collection Time: 02/13/25 11:09 AM   Result Value Ref Range    Sodium 130 (L) 135 " - 145 mmol/L    Potassium 4.2 3.4 - 5.3 mmol/L    Carbon Dioxide (CO2) 19 (L) 22 - 29 mmol/L    Anion Gap 24 (H) 7 - 15 mmol/L    Urea Nitrogen 40.1 (H) 6.0 - 20.0 mg/dL    Creatinine 1.31 (H) 0.67 - 1.17 mg/dL    GFR Estimate 75 >60 mL/min/1.73m2    Calcium 9.4 8.8 - 10.4 mg/dL    Chloride 87 (L) 98 - 107 mmol/L    Glucose 99 70 - 99 mg/dL    Alkaline Phosphatase 80 40 - 150 U/L     (H) 0 - 45 U/L    ALT 88 (H) 0 - 70 U/L    Protein Total 8.5 (H) 6.4 - 8.3 g/dL    Albumin 4.9 3.5 - 5.2 g/dL    Bilirubin Total 1.8 (H) <=1.2 mg/dL   Ethanol GH    Collection Time: 02/13/25 11:09 AM   Result Value Ref Range    Alcohol ethyl <0.01 <=0.01 g/dL   Magnesium    Collection Time: 02/13/25 11:09 AM   Result Value Ref Range    Magnesium 1.7 1.7 - 2.3 mg/dL   CBC with platelets and differential    Collection Time: 02/13/25 11:09 AM   Result Value Ref Range    WBC Count 14.6 (H) 4.0 - 11.0 10e3/uL    RBC Count 4.57 4.40 - 5.90 10e6/uL    Hemoglobin 14.6 13.3 - 17.7 g/dL    Hematocrit 39.6 (L) 40.0 - 53.0 %    MCV 87 78 - 100 fL    MCH 31.9 26.5 - 33.0 pg    MCHC 36.9 (H) 31.5 - 36.5 g/dL    RDW 12.0 10.0 - 15.0 %    Platelet Count 257 150 - 450 10e3/uL    % Neutrophils 90 %    % Lymphocytes 4 %    % Monocytes 5 %    % Eosinophils 1 %    % Basophils 0 %    % Immature Granulocytes 0 %    NRBCs per 100 WBC 0 <1 /100    Absolute Neutrophils 13.1 (H) 1.6 - 8.3 10e3/uL    Absolute Lymphocytes 0.6 (L) 0.8 - 5.3 10e3/uL    Absolute Monocytes 0.7 0.0 - 1.3 10e3/uL    Absolute Eosinophils 0.1 0.0 - 0.7 10e3/uL    Absolute Basophils 0.0 0.0 - 0.2 10e3/uL    Absolute Immature Granulocytes 0.1 <=0.4 10e3/uL    Absolute NRBCs 0.0 10e3/uL   Acetaminophen level    Collection Time: 02/13/25 11:09 AM   Result Value Ref Range    Acetaminophen <5.0 (L) 10.0 - 30.0 ug/mL   Lipase    Collection Time: 02/13/25 11:09 AM   Result Value Ref Range    Lipase 13 13 - 60 U/L   CK total    Collection Time: 02/13/25 11:09 AM   Result Value Ref Range     CK 8,710 () 39 - 308 U/L   EKG 12-lead, tracing only    Collection Time: 02/13/25 11:27 AM   Result Value Ref Range    Systolic Blood Pressure  mmHg    Diastolic Blood Pressure  mmHg    Ventricular Rate 92 BPM    Atrial Rate 92 BPM    CO Interval 148 ms    QRS Duration 96 ms     ms    QTc 499 ms    P Axis 46 degrees    R AXIS 26 degrees    T Axis 33 degrees    Interpretation ECG       Sinus rhythm  Prolonged QT  Abnormal ECG  No previous ECGs available  Confirmed by MD MAGGIE, CHARISSA ([id number]) on 2/13/2025 9:46:40 PM     UA with Microscopic reflex to Culture    Collection Time: 02/13/25  9:39 PM    Specimen: Urine, Clean Catch   Result Value Ref Range    Color Urine Yellow Colorless, Straw, Light Yellow, Yellow    Appearance Urine Clear Clear    Glucose Urine Negative Negative mg/dL    Bilirubin Urine Negative Negative    Ketones Urine 40 (A) Negative mg/dL    Specific Gravity Urine 1.024 1.000 - 1.030    Blood Urine Small (A) Negative    pH Urine 6.0 5.0 - 9.0    Protein Albumin Urine 20 (A) Negative mg/dL    Urobilinogen Urine Normal Normal, 2.0 mg/dL    Nitrite Urine Negative Negative    Leukocyte Esterase Urine Negative Negative    Mucus Urine Present (A) None Seen /LPF    Sperm Urine Present (A) None Seen /HPF    RBC Urine 4 (H) <=2 /HPF    WBC Urine 2 <=5 /HPF   Urine Drug Screen Panel    Collection Time: 02/13/25  9:39 PM   Result Value Ref Range    Amphetamines Urine Screen Positive (A) Screen Negative    Barbituates Urine Screen Negative Screen Negative    Benzodiazepine Urine Screen Positive (A) Screen Negative    Cannabinoids Urine Screen Positive (A) Screen Negative    Cocaine Urine Screen Negative Screen Negative    Fentanyl Qual Urine Screen Positive (A) Screen Negative    Opiates Urine Screen Negative Screen Negative    PCP Urine Screen Negative Screen Negative   CBC with platelets    Collection Time: 02/14/25  5:49 AM   Result Value Ref Range    WBC Count 9.0 4.0 - 11.0 10e3/uL    RBC Count  3.84 (L) 4.40 - 5.90 10e6/uL    Hemoglobin 12.3 (L) 13.3 - 17.7 g/dL    Hematocrit 34.6 (L) 40.0 - 53.0 %    MCV 90 78 - 100 fL    MCH 32.0 26.5 - 33.0 pg    MCHC 35.5 31.5 - 36.5 g/dL    RDW 12.3 10.0 - 15.0 %    Platelet Count 198 150 - 450 10e3/uL   Comprehensive metabolic panel    Collection Time: 02/14/25  5:49 AM   Result Value Ref Range    Sodium 134 (L) 135 - 145 mmol/L    Potassium 3.9 3.4 - 5.3 mmol/L    Carbon Dioxide (CO2) 24 22 - 29 mmol/L    Anion Gap 14 7 - 15 mmol/L    Urea Nitrogen 25.9 (H) 6.0 - 20.0 mg/dL    Creatinine 0.77 0.67 - 1.17 mg/dL    GFR Estimate >90 >60 mL/min/1.73m2    Calcium 8.6 (L) 8.8 - 10.4 mg/dL    Chloride 96 (L) 98 - 107 mmol/L    Glucose 108 (H) 70 - 99 mg/dL    Alkaline Phosphatase 62 40 - 150 U/L     (H) 0 - 45 U/L    ALT 72 (H) 0 - 70 U/L    Protein Total 6.7 6.4 - 8.3 g/dL    Albumin 3.8 3.5 - 5.2 g/dL    Bilirubin Total 1.3 (H) <=1.2 mg/dL   CK total    Collection Time: 02/14/25  5:49 AM   Result Value Ref Range    CK 3,895 (HH) 39 - 308 U/L         Diagnostics:  HBARATH Mai   2/14/2025  8:09 AM

## 2025-02-14 NOTE — PROGRESS NOTES
:     Patient is currently homeless and may benefit from chemical dependency resources.  will continue to follow for discharge planning needs.     GE Atkinson on 2/14/2025 at 1:20 PM

## 2025-02-14 NOTE — PROGRESS NOTES
GENERAL SURGERY CONSULTATION NOTE    Pablo Gonzalez   [address]  [facility] [address]  29 year old  male  Admission Date/Time: 2/13/2025 11:06 AM  Primary Care Provider:  No Ref-Primary, Physician    HPI: Using meth. Frostbite x 2 in 1 week.  Pt is calmer and no longer appearing intoxicated.      REVIEW OF SYSTEMS:    GENERAL: No fevers or chills. Denies fatigue, recent weight loss.  HEENT: No sinus drainage. No changes with vision or hearing. No difficulty swallowing.   LYMPHATICS:  Noswollen nodes in axilla, neck or groin.  CARDIOVASCULAR: Denies chest pain, palpitations and dyspnea on exertion.  PULMONARY: No shortness of breath or cough. No increase in sputum production.  GI: Denies melena,bright red blood in stools. No hematemesis. No constipation or diarrhea.  : No dysuria or hematuria.  SKIN: No recent rashes or ulcers.   HEMATOLOGY:  No history of easy bruising or bleeding.  ENDOCRINE:  Nohistory of diabetes or thyroid problems.  NEUROLOGY:  No history of seizures or headaches. No motor or sensory changes.    Patient Active Problem List   Diagnosis    Cold exposure, initial encounter    Frostbite, initial encounter    Methamphetamine intoxication (H)    Polysubstance abuse (H)        No past medical history on file.    No past surgical history on file.     No family history on file.     Social History     Socioeconomic History    Marital status: Single     Spouse name: Not on file    Number of children: Not on file    Years of education: Not on file    Highest education level: Not on file   Occupational History    Not on file   Tobacco Use    Smoking status: Every Day    Smokeless tobacco: Never    Tobacco comments:     Quit smoking: smoking meth   Substance and Sexual Activity    Alcohol use: Not on file    Drug use: Unknown     Types: Other     Comment: Drug use: Not Asked    Sexual activity: Not on file   Other Topics Concern    Not on file   Social History Narrative    p 5/22/2013.     Social Drivers  of Health     Financial Resource Strain: High Risk (2/13/2025)    Financial Resource Strain     Within the past 12 months, have you or your family members you live with been unable to get utilities (heat, electricity) when it was really needed?: Yes   Food Insecurity: High Risk (2/13/2025)    Food Insecurity     Within the past 12 months, did you worry that your food would run out before you got money to buy more?: Yes     Within the past 12 months, did the food you bought just not last and you didn t have money to get more?: Yes   Transportation Needs: High Risk (2/13/2025)    Transportation Needs     Within the past 12 months, has lack of transportation kept you from medical appointments, getting your medicines, non-medical meetings or appointments, work, or from getting things that you need?: Yes   Physical Activity: Not on file   Stress: Not on file   Social Connections: Not on file   Interpersonal Safety: High Risk (2/13/2025)    Interpersonal Safety     Do you feel physically and emotionally safe where you currently live?: No     Within the past 12 months, have you been hit, slapped, kicked or otherwise physically hurt by someone?: No     Within the past 12 months, have you been humiliated or emotionally abused in other ways by your partner or ex-partner?: No   Housing Stability: High Risk (2/13/2025)    Housing Stability     Do you have housing? : No     Are you worried about losing your housing?: No         Current Facility-Administered Medications   Medication Dose Route Frequency Provider Last Rate Last Admin    acetaminophen (TYLENOL) tablet 650 mg  650 mg Oral Q4H PRN Maxx Hwang MD        Or    acetaminophen (TYLENOL) Suppository 650 mg  650 mg Rectal Q4H PRN Maxx Hwang MD        alum & mag hydroxide-simethicone (MAALOX) suspension 30 mL  30 mL Oral Q4H PRN Maxx Hwang MD        calcium carbonate (TUMS) chewable tablet 1,000 mg  1,000 mg Oral 4x Daily PRN Maxx Hwang MD         gabapentin (NEURONTIN) capsule 300 mg  300 mg Oral TID Ed Gordillo MD   300 mg at 02/14/25 0947    heparin ANTICOAGULANT injection 5,000 Units  5,000 Units Subcutaneous Q8H Maxx Hwang MD   5,000 Units at 02/14/25 0544    HYDROmorphone (PF) (DILAUDID) injection 0.5 mg  0.5 mg Intravenous Q2H PRN Maxx Hwang MD   0.5 mg at 02/14/25 1109    lidocaine (LMX4) cream   Topical Q1H PRN Maxx Hwang MD        lidocaine 1 % 0.1-1 mL  0.1-1 mL Other Q1H PRN Maxx Hwang MD        LORazepam (ATIVAN) injection 0.5-1 mg  0.5-1 mg Intravenous Q4H PRN Maxx Hwang MD   1 mg at 02/13/25 1639    naloxone (NARCAN) injection 0.2 mg  0.2 mg Intravenous Q2 Min PRN Maxx Hwang MD        Or    naloxone (NARCAN) injection 0.4 mg  0.4 mg Intravenous Q2 Min PRN Maxx Hwang MD        Or    naloxone (NARCAN) injection 0.2 mg  0.2 mg Intramuscular Q2 Min PRN Maxx Hwang MD        Or    naloxone (NARCAN) injection 0.4 mg  0.4 mg Intramuscular Q2 Min PRN Maxx Hwang MD        nicotine (NICODERM CQ) 21 MG/24HR 24 hr patch 1 patch  1 patch Transdermal Daily Ed Gordillo MD   1 patch at 02/14/25 0947    ondansetron (ZOFRAN ODT) ODT tab 4 mg  4 mg Oral Q6H PRN Maxx Hwang MD        Or    ondansetron (ZOFRAN) injection 4 mg  4 mg Intravenous Q6H PRN Maxx Hwang MD        oxyCODONE (ROXICODONE) tablet 5 mg  5 mg Oral Q4H PRN Ed Gordillo MD   5 mg at 02/14/25 0947    polyethylene glycol (MIRALAX) Packet 17 g  17 g Oral BID PRN Maxx Hwang MD        prochlorperazine (COMPAZINE) injection 10 mg  10 mg Intravenous Q6H PRN Maxx Hwang MD        Or    prochlorperazine (COMPAZINE) tablet 10 mg  10 mg Oral Q6H PRN Maxx Hwang MD        sendayanara-docusate (SENOKOT-S/PERICOLACE) 8.6-50 MG per tablet 1 tablet  1 tablet Oral BID PRMaxx Estrada MD        Or    senna-docusate (SENOKOT-S/PERICOLACE) 8.6-50 MG per tablet 2 tablet  2 tablet Oral BID PRNEIL Hwang  "Maxx KIM MD        sodium chloride (PF) 0.9% PF flush 3 mL  3 mL Intracatheter Q8H Maxx Hwang MD        sodium chloride (PF) 0.9% PF flush 3 mL  3 mL Intracatheter q1 min prn Maxx Hwang MD   3 mL at 02/14/25 1109    sodium chloride 0.9 % infusion   Intravenous Continuous Maxx Hwang  mL/hr at 02/14/25 0951 New Bag at 02/14/25 0951         ALLERGIES/SENSITIVITIES: No Known Allergies    PHYSICAL EXAM:     /83   Pulse 86   Temp 98.3  F (36.8  C) (Tympanic)   Resp 13   Ht 1.93 m (6' 4\")   Wt 84.5 kg (186 lb 4.6 oz)   SpO2 98%   BMI 22.68 kg/m      General Appearance:   in bed, calm    Ext: Mottled with purple and black discoloration to toes and heels bi laterally.  Multiple vesicles      ADDITIONAL COMMENTS:      CONSULTATION ASSESSMENT AND PLAN:    29 year old male with Observe for demarcation over 1 -2 weeks    - NSAIDs if kidney injury improved.         Yfn Espinoza MD on 2/14/2025 at 11:56 AM      "

## 2025-02-14 NOTE — PLAN OF CARE
"Temp: 99.5  F (37.5  C) Temp src: Tympanic BP: (!) 124/110 Pulse: 101   Resp: 20 SpO2: 96 % Height: 193 cm (6' 4\") Weight: 84.5 kg (186 lb 4.6 oz)  O2 Device: None (Room air)      Problem: Adult Inpatient Plan of Care  Goal: Patient-Specific Goal (Individualized)  Outcome: Progressing  Patient removed nicotine patch this afternoon thinking \"it's been on a long time\". New patch applied to right shoulder. Patient hopeful he can go to detox facility once he is released from hospital. Pt currently un-housed.    Goal: Optimal Comfort and Wellbeing  Outcome: Progressing  Pt c/o pain in BLE, rates it 7/10. PRN oxycodone given at 1450. Effective. VS stable, BLE elevated on three pillows. Seen by wound care this morning for daily dressing changes with xeroform, ABD and Kerlix. NS infusing at 100 ml/hr. Pt tolerating regular diet.         "

## 2025-02-15 ENCOUNTER — APPOINTMENT (OUTPATIENT)
Dept: PHYSICAL THERAPY | Facility: OTHER | Age: 30
End: 2025-02-15
Attending: INTERNAL MEDICINE
Payer: MEDICAID

## 2025-02-15 LAB
ANION GAP SERPL CALCULATED.3IONS-SCNC: 11 MMOL/L (ref 7–15)
BUN SERPL-MCNC: 9.3 MG/DL (ref 6–20)
CALCIUM SERPL-MCNC: 8.8 MG/DL (ref 8.8–10.4)
CHLORIDE SERPL-SCNC: 100 MMOL/L (ref 98–107)
CK SERPL-CCNC: 1915 U/L (ref 39–308)
CREAT SERPL-MCNC: 0.57 MG/DL (ref 0.67–1.17)
EGFRCR SERPLBLD CKD-EPI 2021: >90 ML/MIN/1.73M2
GLUCOSE SERPL-MCNC: 99 MG/DL (ref 70–99)
HCO3 SERPL-SCNC: 28 MMOL/L (ref 22–29)
HOLD SPECIMEN: NORMAL
MAGNESIUM SERPL-MCNC: 1.9 MG/DL (ref 1.7–2.3)
POTASSIUM SERPL-SCNC: 3.8 MMOL/L (ref 3.4–5.3)
SODIUM SERPL-SCNC: 139 MMOL/L (ref 135–145)

## 2025-02-15 PROCEDURE — 80048 BASIC METABOLIC PNL TOTAL CA: CPT | Performed by: INTERNAL MEDICINE

## 2025-02-15 PROCEDURE — 82565 ASSAY OF CREATININE: CPT | Performed by: INTERNAL MEDICINE

## 2025-02-15 PROCEDURE — 250N000013 HC RX MED GY IP 250 OP 250 PS 637: Performed by: INTERNAL MEDICINE

## 2025-02-15 PROCEDURE — 82550 ASSAY OF CK (CPK): CPT | Performed by: INTERNAL MEDICINE

## 2025-02-15 PROCEDURE — 99232 SBSQ HOSP IP/OBS MODERATE 35: CPT | Performed by: INTERNAL MEDICINE

## 2025-02-15 PROCEDURE — 97162 PT EVAL MOD COMPLEX 30 MIN: CPT | Mod: GP

## 2025-02-15 PROCEDURE — 36415 COLL VENOUS BLD VENIPUNCTURE: CPT | Performed by: INTERNAL MEDICINE

## 2025-02-15 PROCEDURE — 97530 THERAPEUTIC ACTIVITIES: CPT | Mod: GP

## 2025-02-15 PROCEDURE — 250N000011 HC RX IP 250 OP 636: Performed by: INTERNAL MEDICINE

## 2025-02-15 PROCEDURE — 97165 OT EVAL LOW COMPLEX 30 MIN: CPT | Mod: GO

## 2025-02-15 PROCEDURE — 97530 THERAPEUTIC ACTIVITIES: CPT | Mod: GO

## 2025-02-15 PROCEDURE — 97535 SELF CARE MNGMENT TRAINING: CPT | Mod: GO

## 2025-02-15 PROCEDURE — 83735 ASSAY OF MAGNESIUM: CPT | Performed by: INTERNAL MEDICINE

## 2025-02-15 PROCEDURE — 84295 ASSAY OF SERUM SODIUM: CPT | Performed by: INTERNAL MEDICINE

## 2025-02-15 PROCEDURE — 120N000001 HC R&B MED SURG/OB

## 2025-02-15 RX ADMIN — LORAZEPAM 0.5 MG: 2 INJECTION INTRAMUSCULAR; INTRAVENOUS at 11:16

## 2025-02-15 RX ADMIN — HEPARIN SODIUM 5000 UNITS: 5000 INJECTION, SOLUTION INTRAVENOUS; SUBCUTANEOUS at 08:00

## 2025-02-15 RX ADMIN — NICOTINE 1 PATCH: 21 PATCH, EXTENDED RELEASE TRANSDERMAL at 16:54

## 2025-02-15 RX ADMIN — GABAPENTIN 300 MG: 300 CAPSULE ORAL at 06:32

## 2025-02-15 RX ADMIN — HYDROMORPHONE HYDROCHLORIDE 0.5 MG: 1 INJECTION, SOLUTION INTRAMUSCULAR; INTRAVENOUS; SUBCUTANEOUS at 09:10

## 2025-02-15 RX ADMIN — LORAZEPAM 0.5 MG: 2 INJECTION INTRAMUSCULAR; INTRAVENOUS at 18:01

## 2025-02-15 RX ADMIN — HEPARIN SODIUM 5000 UNITS: 5000 INJECTION, SOLUTION INTRAVENOUS; SUBCUTANEOUS at 13:43

## 2025-02-15 RX ADMIN — HYDROMORPHONE HYDROCHLORIDE 0.5 MG: 1 INJECTION, SOLUTION INTRAMUSCULAR; INTRAVENOUS; SUBCUTANEOUS at 12:49

## 2025-02-15 RX ADMIN — LORAZEPAM 1 MG: 2 INJECTION INTRAMUSCULAR; INTRAVENOUS at 02:56

## 2025-02-15 RX ADMIN — OXYCODONE HYDROCHLORIDE 5 MG: 5 TABLET ORAL at 01:29

## 2025-02-15 RX ADMIN — HEPARIN SODIUM 5000 UNITS: 5000 INJECTION, SOLUTION INTRAVENOUS; SUBCUTANEOUS at 22:24

## 2025-02-15 RX ADMIN — SODIUM CHLORIDE: 0.9 INJECTION, SOLUTION INTRAVENOUS at 05:32

## 2025-02-15 RX ADMIN — OXYCODONE HYDROCHLORIDE 5 MG: 5 TABLET ORAL at 08:00

## 2025-02-15 RX ADMIN — GABAPENTIN 300 MG: 300 CAPSULE ORAL at 22:24

## 2025-02-15 RX ADMIN — GABAPENTIN 300 MG: 300 CAPSULE ORAL at 13:40

## 2025-02-15 RX ADMIN — OXYCODONE HYDROCHLORIDE 5 MG: 5 TABLET ORAL at 23:34

## 2025-02-15 RX ADMIN — ACETAMINOPHEN 650 MG: 325 TABLET, FILM COATED ORAL at 13:55

## 2025-02-15 RX ADMIN — HYDROMORPHONE HYDROCHLORIDE 0.5 MG: 1 INJECTION, SOLUTION INTRAMUSCULAR; INTRAVENOUS; SUBCUTANEOUS at 16:58

## 2025-02-15 RX ADMIN — OXYCODONE HYDROCHLORIDE 5 MG: 5 TABLET ORAL at 13:55

## 2025-02-15 RX ADMIN — LORAZEPAM 1 MG: 2 INJECTION INTRAMUSCULAR; INTRAVENOUS at 22:23

## 2025-02-15 RX ADMIN — HYDROMORPHONE HYDROCHLORIDE 0.5 MG: 1 INJECTION, SOLUTION INTRAMUSCULAR; INTRAVENOUS; SUBCUTANEOUS at 20:39

## 2025-02-15 ASSESSMENT — ACTIVITIES OF DAILY LIVING (ADL)
ADLS_ACUITY_SCORE: 30
ADLS_ACUITY_SCORE: 22
ADLS_ACUITY_SCORE: 30
ADLS_ACUITY_SCORE: 22
ADLS_ACUITY_SCORE: 22
ADLS_ACUITY_SCORE: 30
ADLS_ACUITY_SCORE: 24
ADLS_ACUITY_SCORE: 30
ADLS_ACUITY_SCORE: 24
ADLS_ACUITY_SCORE: 22
ADLS_ACUITY_SCORE: 24
ADLS_ACUITY_SCORE: 22
ADLS_ACUITY_SCORE: 22
ADLS_ACUITY_SCORE: 30
ADLS_ACUITY_SCORE: 30
ADLS_ACUITY_SCORE: 24
ADLS_ACUITY_SCORE: 30
ADLS_ACUITY_SCORE: 22
ADLS_ACUITY_SCORE: 30
ADLS_ACUITY_SCORE: 22
ADLS_ACUITY_SCORE: 22

## 2025-02-15 NOTE — PROGRESS NOTES
[facility]    Hospitalist Progress Note      Assessment & Plan   Pablo Gonzalez is a 29 year old male who was admitted on 2/13/2025.     Clinically Significant Risk Factors         # Hyponatremia: Lowest Na = 130 mmol/L in last 2 days, will monitor as appropriate  # Hypochloremia: Lowest Cl = 87 mmol/L in last 2 days, will monitor as appropriate  # Hypocalcemia: Lowest Ca = 8.6 mg/dL in last 2 days, will monitor and replace as appropriate    # Anion Gap Metabolic Acidosis: Highest Anion Gap = 24 mmol/L in last 2 days, will monitor and treat as appropriate                             Principal Problem:    Frostbite, initial encounter    Cold exposure, initial encounter    Assessment: Bilateral feet occurred 4 days prior to admission.  On admit ED provider initially contacted burn unit and it was felt he was too late in the course regarding further interventions.  Continuing with aggressive supportive cares.    Plan:   -discontinue IV fluids today  -Appreciate WOC consult  -Appreciate general surgery consult daily for debridement needs  -Aggressive pain control with as needed Dilaudid and oxycodone  -Start scheduled Neurontin  -Had PT/OT services today for early ambulation and weightbearing as tolerated per general surgery  -wound care per general surgery/WOC consult and will wait for their evaluation for daily wound assessment given pain with dressing changes.       Methamphetamine intoxication (H)    Polysubstance abuse (H)    Tobacco abuse    Assessment: Resolving and now weaned off of Precedex, very tearful and regretful and wanting to make changes.    Plan:   -Ativan as needed  -nicotine patch  -Appreciate social work involvement for position needs to treatment facility per patient request    Diet: Combination Diet Regular Diet Adult    DVT Prophylaxis: Heparin SQ  Holt Catheter: Not present  Code Status: Full Code           Disposition Plan     Expected Discharge Date: 02/15/2025              Entered: Ed Gordillo MD 02/15/2025, 10:18 AM       The patient's care was discussed with the Bedside Nurse and Patient.    Ed Gordillo MD  Hospitalist Service  [facility]  Contact information available via [facility] Paging/Directory    ______________________________________________________________________    Interval History   CC: Frostbite    Overnight no acute events and afebrile, pain is currently well-controlled, no fevers chills shortness of breath cough tolerating a regular consistency diet no nausea or vomiting.  He is extremely grateful for all the help and he is wanting to really make some changes in his life.    -Data reviewed today: I reviewed all new labs and imaging results over the last 24 hours.     Physical Exam   Temp: 98.6  F (37  C) Temp src: Tympanic BP: 131/82 Pulse: 86   Resp: 14 SpO2: 100 % O2 Device: None (Room air)    Vitals:    02/13/25 1122 02/13/25 1539 02/15/25 0539   Weight: 95.3 kg (210 lb) 84.5 kg (186 lb 4.6 oz) 84.2 kg (185 lb 10 oz)     Vital Signs with Ranges  Temp:  [98.2  F (36.8  C)-99.5  F (37.5  C)] 98.6  F (37  C)  Pulse:  [] 86  Resp:  [13-25] 14  BP: (109-155)/() 131/82  SpO2:  [96 %-100 %] 100 %  I/O last 3 completed shifts:  In: 3180 [P.O.:840; I.V.:2340]  Out: 5250 [Urine:5250]    GENERAL: Talkative, laying in bed appropriate, in no apparent distress.  CARDIOVASCULAR: regular rate and rhythm, no murmurs, rubs, or gallops. Normal S1/S2. No lower extremity edema.   RESPIRATORY: clear to auscultation bilaterally, no wheezes, no crackles.   GI: soft, non-tender, non-distended, normoactive bowel sounds.  MUSCULOSKELETAL: warm and well perfused, 2+ dorsalis pedis pulses bilaterally.    SKIN: Bilateral feet wrapped with bulky Kerlix dressing, scant serous drainage, clean dry and intact.  Neuro: Awake alert and oriented x 3, no upper extremity tremor, tracking.    Medications   Current Facility-Administered Medications   Medication Dose Route  Frequency Provider Last Rate Last Admin     Current Facility-Administered Medications   Medication Dose Route Frequency Provider Last Rate Last Admin    gabapentin (NEURONTIN) capsule 300 mg  300 mg Oral TID Ed Gordillo MD   300 mg at 02/15/25 0632    heparin ANTICOAGULANT injection 5,000 Units  5,000 Units Subcutaneous Q8H Maxx Hwang MD   5,000 Units at 02/15/25 0800    nicotine (NICODERM CQ) 21 MG/24HR 24 hr patch 1 patch  1 patch Transdermal Daily Ed Gordillo MD   1 patch at 02/14/25 1710    sodium chloride (PF) 0.9% PF flush 3 mL  3 mL Intracatheter Q8H Maxx Hwang MD           Data   Recent Labs   Lab 02/15/25  0519 02/14/25  0549 02/13/25  1109   WBC  --  9.0 14.6*   HGB  --  12.3* 14.6   MCV  --  90 87   PLT  --  198 257    134* 130*   POTASSIUM 3.8 3.9 4.2   CHLORIDE 100 96* 87*   CO2 28 24 19*   BUN 9.3 25.9* 40.1*   CR 0.57* 0.77 1.31*   ANIONGAP 11 14 24*   DA 8.8 8.6* 9.4   GLC 99 108* 99   ALBUMIN  --  3.8 4.9   PROTTOTAL  --  6.7 8.5*   BILITOTAL  --  1.3* 1.8*   ALKPHOS  --  62 80   ALT  --  72* 88*   AST  --  162* 244*   LIPASE  --   --  13       No results found for this or any previous visit (from the past 24 hours).

## 2025-02-15 NOTE — PLAN OF CARE
"Temp: 98.6  F (37  C) Temp src: Tympanic BP: 131/82 Pulse: 75   Resp: 14 SpO2: 100 % Height: 193 cm (6' 4\") Weight: 84.2 kg (185 lb 10 oz)  O2 Device: None (Room air)     Problem: Adult Inpatient Plan of Care  Goal: Plan of Care Review    PRN dilaudid given for PT/OT eval with shower (see Mar). Patient WBAT but does not tolerate any weight on plantar aspect of bilateral feet. Able to transfer from shower chair/wheelchair to bed with upper arm strength. Feet elevated on 3 pillows at all times, heels floated. Daily dressing change performed after shower this morning. Pt tolerated well. Regular diet, adequate oxygenation on RA. Requests that two individuals cannot visit him. Chart updated in treatment team sticky note section, ER  notified.    "

## 2025-02-15 NOTE — PROGRESS NOTES
Transfer NOTE    Patient transferred to med/surg  at 1:15 PM via wheel chair. Accompanied by staff. Transfer plan reviewed with patient, opportunity offered to ask questions. All belongings, clothes, backpack, crossbody bag earbuds sent with patient. Report given to Amanda H. Manja Holter, RN

## 2025-02-15 NOTE — PLAN OF CARE
Problem: Adult Inpatient Plan of Care  Goal: Plan of Care Review  Description: The Plan of Care Review/Shift note should be completed every shift.  The Outcome Evaluation is a brief statement about your assessment that the patient is improving, declining, or no change.  This information will be displayed automatically on your shift  note.  Outcome: Not Progressing  Flowsheets (Taken 2/15/2025 0327)  Outcome Evaluation: Patient remains alert and calm. Patient says he sees things outside the window but is easily redirectable.  Plan of Care Reviewed With: patient  Overall Patient Progress: no change     Problem: Pain Acute  Goal: Optimal Pain Control and Function  Outcome: Not Progressing  Intervention: Develop Pain Management Plan  Recent Flowsheet Documentation  Taken 2/14/2025 1927 by Amol Watts RN  Pain Management Interventions: medication (see MAR)  Intervention: Prevent or Manage Pain  Recent Flowsheet Documentation  Taken 2/15/2025 0030 by Amol Watts, RN  Sleep/Rest Enhancement: awakenings minimized  Medication Review/Management: medications reviewed  Taken 2/14/2025 1927 by Amol Watts, RN  Sleep/Rest Enhancement: awakenings minimized  Medication Review/Management: medications reviewed     Problem: Excessive Substance Use  Goal: Optimized Energy Level (Excessive Substance Use)  Outcome: Not Progressing  Goal: Improved Behavioral Control (Excessive Substance Use)  Outcome: Not Progressing  Goal: Increased Participation and Engagement (Excessive Substance Use)  Outcome: Not Progressing  Goal: Improved Physiologic Symptoms (Excessive Substance Use)  Outcome: Not Progressing  Goal: Enhanced Social, Occupational or Functional Skills (Excessive Substance Use)  Outcome: Not Progressing   Goal Outcome Evaluation:      Plan of Care Reviewed With: patient    Overall Patient Progress: no changeOverall Patient Progress: no change    Outcome Evaluation: Patient remains alert and calm. Patient  says he sees things outside the window but is easily redirectable.

## 2025-02-15 NOTE — PROGRESS NOTES
"[facility] ADMISSION NOTE    Patient admitted to room 313 at approximately 1320 via wheel chair from ICU. Patient was accompanied by nurse.     Verbal SBAR report received from STEPHANIE Klein prior to patient arrival.     Patient ambulated to bed with stand-by assist. Patient alert and oriented X 3. Pain is controlled with current analgesics.  Medication(s) being used: dilaudid.  . Admission vital signs: Blood pressure 131/82, pulse 75, temperature 98.6  F (37  C), temperature source Tympanic, resp. rate 14, height 1.93 m (6' 4\"), weight 84.2 kg (185 lb 10 oz), SpO2 100%. Patient was oriented to plan of care, call light, bed controls, tv, telephone, bathroom, and visiting hours.     Risk Assessment    The following safety risks were identified during admission: fall and skin. Yellow risk band applied: YES.     Skin Initial Assessment    This writer admitted this patient and completed a full skin assessment and Bryant score in the Adult PCS flowsheet.   Photo documentation of skin problem and/or wound competed via Riskified application (located under Media):  Yes    Appropriate interventions initiated as needed.         Bryant Risk Assessment  Sensory Perception: 3-->slightly limited  Moisture: 3-->occasionally moist  Activity: 2-->chairfast  Mobility: 3-->slightly limited  Nutrition: 3-->adequate  Friction and Shear: 3-->no apparent problem  Bryant Score: 17  Moisture Interventions: Incontinence pad  Sensory/Activity/Mobility Interventions: Turn: left/right positioning  Mattress: Low air loss (MERISSA pump, Isolibrium, Skin Guard, Pulsate, Isoair, etc.)  Bed Frame: Standard width and length    Education    Patient has a [place] to Observation order: No  Observation education completed and documented: N/A      Jannette Landon RN      "

## 2025-02-15 NOTE — PROGRESS NOTES
02/15/25 1323   Appointment Info   Signing Clinician's Name / Credentials (OT) Digna Perez Sauter, OTR/L   General Information   Referring Physician Dr Ed Gordillo   General Observations and Info Pt is homeless and had been walking outdoors in freezing temps, has severe frostbite and severe pain B feet. Will have assessment by surgeon   Cognitive Status Examination   Orientation Status person;other (see comments)  (situation)   Behavioral Issues other (see comments)  (Pt verbalizes appreciation and thanks ICU staff and therapists throughout session)   Affect/Mental Status (Cognitive) other (see comments)  (somewhat impulsive, requires reminders to wait to perform transfer until sufficient plan and assisting staff in place)   Clinical Impression   Criteria for Skilled Therapeutic Interventions Met (OT) Yes, treatment indicated   OT Diagnosis decline in ADL and functionla mobility   Influenced by the following impairments difficulty managing transfers, mobility for self care tasks   OT Problem List-Impairments impacting ADL mobility   Assessment of Occupational Performance 1-3 Performance Deficits   Planned Therapy Interventions (OT) ADL retraining;transfer training   Clinical Decision Making Complexity (OT) problem focused assessment/low complexity   Risk & Benefits of therapy have been explained patient   OT Total Evaluation Time   OT Eval, Low Complexity Minutes (76355) 15   OT Goals   Therapy Frequency (OT) Daily   OT Predicted Duration/Target Date for Goal Attainment 02/19/25   OT Goals Transfers;Toilet Transfer/Toileting;Lower Body Bathing   OT: Lower Body Bathing Minimal assist   OT: Transfer Minimal assist   OT: Toilet Transfer/Toileting Minimal assist;using adaptive equipment   Self-Care/Home Management   Self-Care/Home Mgmt/ADL, Compensatory, Meal Prep Minutes (77444) 15   Treatment Detail/Skilled Intervention Pt motivated to do as much as he can in completing bathing task and associated transferring bed  to/from rolling shower chair. He requires some vc to slow down and wait for assist. He is unable to tolerate standing due to injury and pain to B feet.   [place] Level (Bathing Training) minimum assist (75% patient effort)   Assistance (Bathing Training) 1 person + 1 person to manage equipment   Assistive Device (Bathing Training) grab bars;detachable shower head;other (see comments)  (rolling shower chair)   Therapeutic Activities   Therapeutic Activity Minutes (01552) 10   Treatment Detail/Skilled Intervention bed mobility with min assist of 2; supportive positioning to elevate feet   OT Discharge Planning   OT Plan OT to continue   OT Discharge Recommendation (DC Rec) Transitional Care Facility   OT Rationale for DC Rec Pt requires instruction for safety and physical assist for ADL, transfers and mob   OT Brief overview of current status Pt required mod assist of 2 plus additional person to assist with transfers bed<>rolling shower chair, min assist with shower task and dressing. He is unable to bear weight or tolerate pressure to B feet due to pain from frostbite injury   OT Equipment Needed at Discharge wheelchair;shower chair;bathing equipment   Total Session Time   Timed Code Treatment Minutes 25   Total Session Time (sum of timed and untimed services) 40

## 2025-02-15 NOTE — PROGRESS NOTES
02/15/25 1300   Appointment Info   Signing Clinician's Name / Credentials (PT) Chase Dumont, PT, OCS   Self-Care   Usual Activity Tolerance excellent   Current Activity Tolerance poor   General Information   Onset of Illness/Injury or Date of Surgery 02/13/25   Referring Physician Dr. Gordillo   Patient/Family Therapy Goals Statement (PT) Improve mobility   Pertinent History of Current Problem (include personal factors and/or comorbidities that impact the POC) patient admitted with severe frostbite on toes and feet bilaterally   Weight-Bearing Status - LLE   (Painful to WB)   Weight-Bearing Status - RLE   (painful to WB)   Cognition   Affect/Mental Status (Cognition) anxious   Orientation Status (Cognition) person;place;situation   Follows Commands (Cognition) follows multi-step commands   Safety Deficit (Cognition) minimal deficit;impulsivity;safety precautions awareness   Memory Deficit (Cognition) unable/difficult to assess   Pain Assessment   Patient Currently in Pain Yes, see Vital Sign flowsheet   Integumentary/Edema   Integumentary/Edema Comments Severe frostbite on bilateral feet and toes   Range of Motion (ROM)   ROM Comment LE ROM is WFL for transfers. BIlateral ankle ROM is painful, not able to assess toe ROM due to pain   Strength (Manual Muscle Testing)   Strength Comments UE strength 5/5 throughout. LE strength limited by severe pain in feet .   Bed Mobility   Comment, (Bed Mobility) Independent with bed mobility   Transfers   Comment, (Transfers) Able to perform transfer from bed to chair using UE's to boost from bed<>chair. Min A with LE.   Gait/Stairs (Locomotion)   Comment, (Gait/Stairs) Patient unable to weightbear today secondary to bilateral foot pain. Tried to use a FWW but unable to stand on feet secondary to pain.   Balance   Balance Comments Poor   Sensory Examination   Sensory Perception Comments Impaired in feet   Clinical Impression   Criteria for Skilled Therapeutic Intervention Yes,  treatment indicated   PT Diagnosis (PT) Impaired mobility and transfers secondary to severe frostbite on both feet.   Influenced by the following impairments pain, weakness, impaired gait   Functional limitations due to impairments impaired gait and transfers.   Clinical Presentation (PT Evaluation Complexity) evolving   Clinical Presentation Rationale Patients condition is evolving secondary to severe frostbite on feet   Clinical Decision Making (Complexity) moderate complexity   Planned Therapy Interventions (PT) bed mobility training;transfer training;gait training   Risk & Benefits of therapy have been explained evaluation/treatment results reviewed;risks/benefits reviewed;care plan/treatment goals reviewed;current/potential barriers reviewed;participants voiced agreement with care plan;participants included;patient   PT Total Evaluation Time   PT Eval, Moderate Complexity Minutes (90904) 20   Physical Therapy Goals   PT Frequency Daily   PT Predicted Duration/Target Date for Goal Attainment 02/21/25   PT Goals Bed Mobility;Transfers;Gait   PT: Bed Mobility Independent   PT: Transfers Modified independent;Sit to/from stand;Bed to/from chair;Assistive device;Within precautions   PT: Gait 5 feet;Within precautions;Rolling walker;Modified independent   Therapeutic Activity   Therapeutic Activities: dynamic activities to improve functional performance Minutes (72190) 20   Symptoms Noted During/After Treatment Increased pain   Treatment Detail/Skilled Intervention Patient unable to stand with walker secondary to bilateral foot pain. Tranferred bed<>shower chair using his UE to boost himself over. Min A with LE during transfer   PT Discharge Planning   PT Plan Continue PT   PT Discharge Recommendation (DC Rec) Transitional Care Facility   PT Rationale for DC Rec Patient's current condition is complex and evolving. He requires assist with transfers. He is currently not able to walk   PT Brief overview of current status  patient is unable to walk secondary to severe bilateral foot pain. He requires assist when tranferring from bed<>chair. He ia able to use his UE to boost himself frmo bed<>chair. Requires assist with set up and transfer   PT Total Distance Amb During Session (feet) 0   PT Equipment Needed at Discharge wheelchair;walker, rolling

## 2025-02-15 NOTE — PLAN OF CARE
"Goal Outcome Evaluation:    A/O x 3.  Hx of polysubstance abuse, meth use.  Pt does talk back to his TV in the room, or there are times he is hearing voices in the dong way and he thinks ppl are talking about him. PRN ativan available.  here with frost bite to bilateral feet. Pain controled with PRN tylenol, IV dilaudid, and oxy.  Pt rated pain 8/10 down to 5/10 after tylenol and oxy.  Pt was up to bathroom with SBA x 1 and wheel chair.  He is able to pivot to wheelchair but is a bit \"Robb\".  He has good upper body strength.  Bilateral feet are elevated on 2-3 pillows.  Daily dressing changes, today he had a shower and dressing change was done by ICU nurse.  On sticky note pt has two ppl he doesn't want to visit him.  See ICU note on 2/15 at 1245.        /82   Pulse 75   Temp 98.6  F (37  C) (Tympanic)   Resp 14   Ht 1.93 m (6' 4\")   Wt 84.2 kg (185 lb 10 oz)   SpO2 100%   BMI 22.60 kg/m                "

## 2025-02-15 NOTE — PROGRESS NOTES
Patient slept most of the night. Patient kept saying someone was outside the window and showed and reassured him no one was outside. Also said someone was shooting the window and that he needed to change rooms. Showed him that no one was shooting the window. This reassured the patient. Patient given ativan for anxiety and was able to relax. No needs at this time.

## 2025-02-15 NOTE — PHARMACY
Pharmacy - Transfer Medication Reconciliation     The patient's transfer medication orders have been compared to the medication administration record and to the Prior to Admissions Medications list - any noted discrepancies were resolved with the MD. Note is from ICU -> Medsurg transfer from day prior 2/14/25    Thank you. Pharmacy will continue to monitor.     Mirza Salinas [facility] ....................  2/15/2025   3:55 PM

## 2025-02-16 ENCOUNTER — APPOINTMENT (OUTPATIENT)
Dept: PHYSICAL THERAPY | Facility: OTHER | Age: 30
End: 2025-02-16
Payer: MEDICAID

## 2025-02-16 LAB
ANION GAP SERPL CALCULATED.3IONS-SCNC: 10 MMOL/L (ref 7–15)
BACTERIA SPEC CULT: NORMAL
BUN SERPL-MCNC: 9.3 MG/DL (ref 6–20)
C TRACH DNA SPEC QL PROBE+SIG AMP: NEGATIVE
CALCIUM SERPL-MCNC: 9.3 MG/DL (ref 8.8–10.4)
CHLORIDE SERPL-SCNC: 95 MMOL/L (ref 98–107)
CK SERPL-CCNC: 640 U/L (ref 39–308)
CREAT SERPL-MCNC: 0.67 MG/DL (ref 0.67–1.17)
EGFRCR SERPLBLD CKD-EPI 2021: >90 ML/MIN/1.73M2
GLUCOSE SERPL-MCNC: 114 MG/DL (ref 70–99)
GRAM STAIN RESULT: NORMAL
HCO3 SERPL-SCNC: 30 MMOL/L (ref 22–29)
N GONORRHOEA DNA SPEC QL NAA+PROBE: NEGATIVE
POTASSIUM SERPL-SCNC: 3.6 MMOL/L (ref 3.4–5.3)
SODIUM SERPL-SCNC: 135 MMOL/L (ref 135–145)
SPECIMEN TYPE: NORMAL

## 2025-02-16 PROCEDURE — 36415 COLL VENOUS BLD VENIPUNCTURE: CPT | Performed by: INTERNAL MEDICINE

## 2025-02-16 PROCEDURE — 99232 SBSQ HOSP IP/OBS MODERATE 35: CPT | Mod: 25 | Performed by: INTERNAL MEDICINE

## 2025-02-16 PROCEDURE — 250N000013 HC RX MED GY IP 250 OP 250 PS 637: Performed by: INTERNAL MEDICINE

## 2025-02-16 PROCEDURE — 97530 THERAPEUTIC ACTIVITIES: CPT | Mod: GO

## 2025-02-16 PROCEDURE — 250N000011 HC RX IP 250 OP 636: Performed by: INTERNAL MEDICINE

## 2025-02-16 PROCEDURE — 87491 CHLMYD TRACH DNA AMP PROBE: CPT | Performed by: INTERNAL MEDICINE

## 2025-02-16 PROCEDURE — 120N000001 HC R&B MED SURG/OB

## 2025-02-16 PROCEDURE — 97110 THERAPEUTIC EXERCISES: CPT | Mod: GP

## 2025-02-16 PROCEDURE — 82550 ASSAY OF CK (CPK): CPT | Performed by: INTERNAL MEDICINE

## 2025-02-16 PROCEDURE — 80048 BASIC METABOLIC PNL TOTAL CA: CPT | Performed by: INTERNAL MEDICINE

## 2025-02-16 PROCEDURE — 99232 SBSQ HOSP IP/OBS MODERATE 35: CPT | Performed by: SURGERY

## 2025-02-16 RX ORDER — QUETIAPINE FUMARATE 25 MG/1
50 TABLET, FILM COATED ORAL
Status: DISCONTINUED | OUTPATIENT
Start: 2025-02-16 | End: 2025-02-17

## 2025-02-16 RX ORDER — HALOPERIDOL 5 MG/ML
2 INJECTION INTRAMUSCULAR
Status: DISCONTINUED | OUTPATIENT
Start: 2025-02-16 | End: 2025-02-16

## 2025-02-16 RX ORDER — HALOPERIDOL 5 MG/ML
1 INJECTION INTRAMUSCULAR
Status: DISCONTINUED | OUTPATIENT
Start: 2025-02-16 | End: 2025-02-19 | Stop reason: HOSPADM

## 2025-02-16 RX ORDER — HALOPERIDOL 5 MG/ML
2 INJECTION INTRAMUSCULAR ONCE
Status: COMPLETED | OUTPATIENT
Start: 2025-02-16 | End: 2025-02-16

## 2025-02-16 RX ORDER — BENZTROPINE MESYLATE 1 MG/1
1 TABLET ORAL DAILY
Status: DISCONTINUED | OUTPATIENT
Start: 2025-02-16 | End: 2025-02-16

## 2025-02-16 RX ORDER — HALOPERIDOL 0.5 MG/1
2 TABLET ORAL 2 TIMES DAILY
Status: DISCONTINUED | OUTPATIENT
Start: 2025-02-16 | End: 2025-02-16

## 2025-02-16 RX ADMIN — ACETAMINOPHEN 650 MG: 325 TABLET, FILM COATED ORAL at 13:38

## 2025-02-16 RX ADMIN — GABAPENTIN 300 MG: 300 CAPSULE ORAL at 20:49

## 2025-02-16 RX ADMIN — HALOPERIDOL LACTATE 2 MG: 5 INJECTION, SOLUTION INTRAMUSCULAR at 02:20

## 2025-02-16 RX ADMIN — OXYCODONE HYDROCHLORIDE 5 MG: 5 TABLET ORAL at 13:38

## 2025-02-16 RX ADMIN — HEPARIN SODIUM 5000 UNITS: 5000 INJECTION, SOLUTION INTRAVENOUS; SUBCUTANEOUS at 20:51

## 2025-02-16 RX ADMIN — GABAPENTIN 300 MG: 300 CAPSULE ORAL at 08:16

## 2025-02-16 RX ADMIN — HEPARIN SODIUM 5000 UNITS: 5000 INJECTION, SOLUTION INTRAVENOUS; SUBCUTANEOUS at 13:41

## 2025-02-16 RX ADMIN — HEPARIN SODIUM 5000 UNITS: 5000 INJECTION, SOLUTION INTRAVENOUS; SUBCUTANEOUS at 08:16

## 2025-02-16 RX ADMIN — LORAZEPAM 1 MG: 2 INJECTION INTRAMUSCULAR; INTRAVENOUS at 08:17

## 2025-02-16 RX ADMIN — NICOTINE 1 PATCH: 21 PATCH, EXTENDED RELEASE TRANSDERMAL at 15:27

## 2025-02-16 RX ADMIN — LORAZEPAM 1 MG: 2 INJECTION INTRAMUSCULAR; INTRAVENOUS at 20:49

## 2025-02-16 RX ADMIN — BENZTROPINE MESYLATE 1 MG: 1 TABLET ORAL at 02:19

## 2025-02-16 RX ADMIN — LORAZEPAM 1 MG: 2 INJECTION INTRAMUSCULAR; INTRAVENOUS at 15:27

## 2025-02-16 RX ADMIN — GABAPENTIN 300 MG: 300 CAPSULE ORAL at 13:38

## 2025-02-16 ASSESSMENT — ACTIVITIES OF DAILY LIVING (ADL)
ADLS_ACUITY_SCORE: 38
ADLS_ACUITY_SCORE: 39
ADLS_ACUITY_SCORE: 30
ADLS_ACUITY_SCORE: 30
ADLS_ACUITY_SCORE: 38
ADLS_ACUITY_SCORE: 39
ADLS_ACUITY_SCORE: 30
ADLS_ACUITY_SCORE: 38
ADLS_ACUITY_SCORE: 30
ADLS_ACUITY_SCORE: 38
ADLS_ACUITY_SCORE: 38
ADLS_ACUITY_SCORE: 34
ADLS_ACUITY_SCORE: 38
ADLS_ACUITY_SCORE: 39
ADLS_ACUITY_SCORE: 38
ADLS_ACUITY_SCORE: 38
ADLS_ACUITY_SCORE: 30
ADLS_ACUITY_SCORE: 38
ADLS_ACUITY_SCORE: 39

## 2025-02-16 NOTE — PROGRESS NOTES
02/16/25 0107   Appointment Info   Signing Clinician's Name / Credentials (OT) Digna Perez Dariana, OTR/L   Therapeutic Activities   Therapeutic Activity Minutes (16775) 15   Symptoms noted during/after treatment increased pain   Treatment Detail/Skilled Intervention Pt has B feet wrapped completely to ankles with layered gauze dressing. He completed bed mobility with SBA. Disposable alexander placed on floor for pt to gently attempt light pressure to bottom of each foot while seated EOB; pt able to lightly touch feet to alexander on floor but not able to tolerate pressure. He reports anxiety and has several questions regarding surgical plan. OT recommended pt write down his questions for consult with upcoming consult with surgeon or with hospitalist. He is cooperative, reports anxiety about prognosis regarding his feet, but trying to remain positive.   OT Discharge Planning   OT Plan OT to continue   OT Discharge Recommendation (DC Rec) Transitional Care Facility   OT Rationale for DC Rec Pt requires instruction for safety and physical assist for ADL, transfers and mob   OT Brief overview of current status Pt reports anxiety and has several questions regarding surgical plan. OT recommended pt write down his questions for consult with upcoming consult with surgeon or with hospitalist. He is cooperative, reports anxiety about prognosis regarding his feet, but trying to remain positive.   Total Session Time   Timed Code Treatment Minutes 15   Total Session Time (sum of timed and untimed services) 15

## 2025-02-16 NOTE — PROGRESS NOTES
02/16/25 1500   Appointment Info   Signing Clinician's Name / Credentials (PT) Chase Dumont, PT, OCS   Therapeutic Procedure/Exercise   Ther. Procedure: strength, endurance, ROM, flexibillity Minutes (25665) 15   Symptoms Noted During/After Treatment increased pain   Treatment Detail/Skilled Intervention Seated at EOB: LAQ x 25 B. Hip flexion x 25B. Supine in bed: SLR x 10B. Ankle pumps x 10B.   Therapeutic Activity   Treatment Detail/Skilled Intervention Patient transferred to wheelchair with nursing. Unable to weightbear secondary to pain in feet   PT Discharge Planning   PT Plan Continue PT   PT Discharge Recommendation (DC Rec) Transitional Care Facility   PT Rationale for DC Rec Patient's current condition is complex and evolving. He requires assist with transfers to a wheelchair. He is currently not able to walk secondary to pain and wounds on feet.   PT Brief overview of current status patient is unable to walk secondary to severe bilateral foot pain. He requires assist when tranferring from bed<>chair. He ia able to use his UE to boost himself from bed<>chair. Requires assist with set up and transfers   PT Total Distance Amb During Session (feet) 0   PT Equipment Needed at Discharge wheelchair;walker, rolling   Physical Therapy Time and Intention   Timed Code Treatment Minutes 15   Total Session Time (sum of timed and untimed services) 15

## 2025-02-16 NOTE — PROGRESS NOTES
[facility]    Hospitalist Progress Note      Assessment & Plan   Pablo Gonzalez is a 29 year old male who was admitted on 2/13/2025.     Clinically Significant Risk Factors                                          Principal Problem:    Frostbite, initial encounter    Cold exposure, initial encounter    Assessment: stable.  No evidence of bacterial coinfection.  Occurred 2 bilateral feet occurred 4 days prior to admission.  On admit ED provider initially contacted burn unit and it was felt he was too late in the course regarding further interventions.  Continuing with aggressive supportive cares.    Plan:   -Appreciate WOC consult  -Appreciate general surgery consult daily for debridement needs  -Aggressive pain control with as needed Dilaudid and oxycodone  -Start scheduled Neurontin  -ongoing PT/OT services for early ambulation and weightbearing as tolerated per general surgery  -wound care per general surgery/WOC consult and will wait for their evaluation for daily wound assessment given pain with dressing changes.   -Appreciate social work involvement for dispo needs      Methamphetamine intoxication (H)    Polysubstance abuse (H)    Tobacco abuse    Assessment: Stable, initially started on Precedex drip on admit.  Intermittent hallucinations at night, received Haldol last night, slightly somnolent this morning but behaviors very appropriate he remains very appreciative of the care.     Plan:   -Ativan as needed  -Encephalopathy order set placed  -Discontinue Cogentin and Haldol  -Seroquel nightly as needed  -nicotine patch    Diet: Combination Diet Regular Diet Adult    DVT Prophylaxis: Heparin SQ  Holt Catheter: Not present  Code Status: Full Code           Disposition Plan      Entered: Ed Gordillo MD 02/16/2025, 10:43 AM       The patient's care was discussed with the Bedside Nurse and Patient and general surgery    Ed Gordillo MD  Hospitalist Service  [facility]  Contact information available via [facility] Paging/Directory    ______________________________________________________________________    Interval History   CC: Frostbite    Overnight afebrile, he did develop some slight agitation, had good effect with administration of 2 mg IV Haldol x 1.  He states he slept very well, pain is currently well-controlled, tolerated regular consistency breakfast, no shortness of breath cough dysuria hematuria urgency frequency nausea vomiting diarrhea no other new skin sores or rashes.  He is very appreciative of his \"no chance at life\".  Looking forward to exploring dispo options to treatment center and snf house tomorrow when social work comes back.    -Data reviewed today: I reviewed all new labs and imaging results over the last 24 hours.     Physical Exam   Temp: 97.9  F (36.6  C) Temp src: Tympanic BP: (!) 129/91 Pulse: 92   Resp: 18 SpO2: 99 % O2 Device: None (Room air)    Vitals:    02/13/25 1122 02/13/25 1539 02/15/25 0539   Weight: 95.3 kg (210 lb) 84.5 kg (186 lb 4.6 oz) 84.2 kg (185 lb 10 oz)     Vital Signs with Ranges  Temp:  [97.9  F (36.6  C)-98.6  F (37  C)] 97.9  F (36.6  C)  Pulse:  [75-94] 92  Resp:  [16-18] 18  BP: (125-146)/(72-93) 129/91  SpO2:  [97 %-100 %] 99 %  I/O last 3 completed shifts:  In: 3525 [P.O.:3280; I.V.:245]  Out: 5100 [Urine:5100]    GENERAL: Talkative, laying in bed appropriate, in no apparent distress.  CARDIOVASCULAR: regular rate and rhythm, no murmurs, rubs, or gallops. Normal S1/S2. No lower extremity edema.   RESPIRATORY: clear to auscultation bilaterally, no wheezes, no crackles.   GI: soft, non-tender, non-distended, normoactive bowel sounds.  MUSCULOSKELETAL: warm and well perfused, 2+ dorsalis pedis pulses bilaterally.    SKIN: Bilateral feet wrapped with bulky Kerlix dressings again today.  Neuro: Awake alert and oriented x 3, no upper extremity tremor, appropriate tracking and pleasant.    Medications   Current " Facility-Administered Medications   Medication Dose Route Frequency Provider Last Rate Last Admin     Current Facility-Administered Medications   Medication Dose Route Frequency Provider Last Rate Last Admin    gabapentin (NEURONTIN) capsule 300 mg  300 mg Oral TID Ed Gordlilo MD   300 mg at 02/16/25 0816    heparin ANTICOAGULANT injection 5,000 Units  5,000 Units Subcutaneous Q8H Maxx Hwang MD   5,000 Units at 02/16/25 0816    nicotine (NICODERM CQ) 21 MG/24HR 24 hr patch 1 patch  1 patch Transdermal Daily Ed Gordillo MD   1 patch at 02/15/25 1654    sodium chloride (PF) 0.9% PF flush 3 mL  3 mL Intracatheter Q8H Maxx Hwang MD   3 mL at 02/16/25 0816       Data   Recent Labs   Lab 02/15/25  0519 02/14/25  0549 02/13/25  1109   WBC  --  9.0 14.6*   HGB  --  12.3* 14.6   MCV  --  90 87   PLT  --  198 257    134* 130*   POTASSIUM 3.8 3.9 4.2   CHLORIDE 100 96* 87*   CO2 28 24 19*   BUN 9.3 25.9* 40.1*   CR 0.57* 0.77 1.31*   ANIONGAP 11 14 24*   DA 8.8 8.6* 9.4   GLC 99 108* 99   ALBUMIN  --  3.8 4.9   PROTTOTAL  --  6.7 8.5*   BILITOTAL  --  1.3* 1.8*   ALKPHOS  --  62 80   ALT  --  72* 88*   AST  --  162* 244*   LIPASE  --   --  13       No results found for this or any previous visit (from the past 24 hours).

## 2025-02-16 NOTE — PROGRESS NOTES
PROGRESS NOTE        HPI: Following frostbite.  Sleeping today.    EXAM:  Date 02/16/25 0700 - 02/17/25 0659   Shift 4324-1261 0682-8582 2005-3192 24 Hour Total   INTAKE   P.O. 360   360   Shift Total(mL/kg) 360(4.28)   360(4.28)   OUTPUT   Shift Total(mL/kg)       Weight (kg) 84.2 84.2 84.2 84.2        Extremities: The toes remain purple to black.  Viable tissue is still not clearly demarcated.    LABS  Recent Labs   Lab Test 02/14/25  0549 02/13/25  1109   WBC 9.0 14.6*   RBC 3.84* 4.57   HGB 12.3* 14.6   HCT 34.6* 39.6*   MCV 90 87   MCH 32.0 31.9   MCHC 35.5 36.9*    257       Recent Labs   Lab Test 02/15/25  0519 02/14/25  0549   POTASSIUM 3.8 3.9   CHLORIDE 100 96*   BUN 9.3 25.9*       Recent Labs   Lab Test 02/14/25  0549 02/13/25  2139 02/13/25  1109   PROTEIN  --  20*  --    BILITOTAL 1.3*  --  1.8*   *  --  244*   ALT 72*  --  88*       IMAGING      ASSESSMENT  29-year-old polysubstance user with repeated frostbite injuries.    PLAN  No need for emergent amputation for infection.  Wait for demarcation.  May take 6 weeks.

## 2025-02-16 NOTE — PROGRESS NOTES
Pt is very anxious.  He wanted to go for a walk, but can't. He states he doesn't know what to do.  He asked If we could go for a walk in the wheel chair.  Pt got himself to the wheelchair, but it is very painful for him to do this, but he wanted to.  Brought pt down to [facility].  He was happy to see the outside, he states that's the most he has gotten in see in a few days. He asks if we can go on a walk tomorrow.  He is also excited to see PT/OT.   Brought pt back to room, he was able to self pivot back to bed but still with a lot of pain.  Pt likes his SCD's, he feels that its helping his feet get blood.  He is very worried about losing his feet and toes.  He asks a lot of questions about when surgery will be, he asks if the surgeon is going to cut his feet off.  He is asking if he will be able to walk with no toes.  He is tearful at times, and says he wants a second chance and feels that he will be ok in the end.  Ativan administered per pt request. He is clammy, asked for a fan.

## 2025-02-16 NOTE — PLAN OF CARE
Goal Outcome Evaluation: Patient has been mostly calm and cooperative. Ativan given x2 for anxiety and disorientation. Dressings to bilateral feet changed per orders. Skin is black with small-moderate amount of serous and serosanguinous drainage. Blistering to right foot noted. BLE elevated and heels floated. Per patient, limited feeling in toes. Pain medication given x1 for c/o foot and ankle tenderness. Adequate oral intake. VSS. Afebrile.     Plan of Care Reviewed With: patient    Overall Patient Progress: no change Overall Patient Progress: no change

## 2025-02-16 NOTE — PLAN OF CARE
"Goal Outcome Evaluation:    Patient alert and oriented. Mood calm and cooperative at beginning of shift. Verbally aggressive behaviors increased shortly after. Patient heard and observed having auditory and visual hallucinations. Patient became verbally aggressive with writer and stated \"I know you're with them, I want to speak with my , I can hear him giggling out in the hallway.\" Provider notified and Haldol and benztropine ordered and given. Patient shortly fell asleep. Refused all further interventions and vitals from haldol administration @ 0220.    Temp: 97.9  F (36.6  C) Temp src: Tympanic BP: (!) 129/91 Pulse: 92   Resp: 18 SpO2: 99 % O2 Device: None (Room air)     "

## 2025-02-16 NOTE — PROGRESS NOTES
This is a 29 year old male with history of methamphetamine use, who presented with a frost bite to both feet that happened 5 days ago      Nursing contacted me  stating that the patient was extremely abusive to the staff and was also hallucinating.  Review of patient's medical records indicated that the patient is a methamphetamine user.     Based on the symptoms as described Haldol would be the best choice for this patient.  Haldol, also is a first generation antipsychotic medication that is commonly used to manage acute agitation and aggressive behavior in patients with methamphetamine intoxication.  It has a rapid onset of action and can effectively control symptoms of agitation and aggression.  Additionally Haldol has been shown to be effective in managing acute psychosis and agitation in patients with substance intoxication including methamphetamine.  Use of Haldol in this scenario is supported by evidence-based medicine for the management of acute agitation and aggression in the emergency department and inpatient settings.    Assessment/Plan   Hallucinations, agitation and aggression towards staff, are symptoms suggestive of methamphetamine withdrawal. Patient's had this behavior during daytime as well as nighttime per  nursing notes.    -   Will give Haldol 2 mg IV now, stat  - Continue with haldol 2mg po bid  - Congentin 1mg po q day

## 2025-02-17 ENCOUNTER — APPOINTMENT (OUTPATIENT)
Dept: PHYSICAL THERAPY | Facility: OTHER | Age: 30
End: 2025-02-17
Payer: MEDICAID

## 2025-02-17 ENCOUNTER — APPOINTMENT (OUTPATIENT)
Dept: OCCUPATIONAL THERAPY | Facility: OTHER | Age: 30
End: 2025-02-17
Payer: MEDICAID

## 2025-02-17 ENCOUNTER — APPOINTMENT (OUTPATIENT)
Dept: CT IMAGING | Facility: OTHER | Age: 30
End: 2025-02-17
Attending: INTERNAL MEDICINE
Payer: MEDICAID

## 2025-02-17 LAB
ALBUMIN SERPL BCG-MCNC: 4 G/DL (ref 3.5–5.2)
ALP SERPL-CCNC: 60 U/L (ref 40–150)
ALT SERPL W P-5'-P-CCNC: 67 U/L (ref 0–70)
ANION GAP SERPL CALCULATED.3IONS-SCNC: 10 MMOL/L (ref 7–15)
AST SERPL W P-5'-P-CCNC: 75 U/L (ref 0–45)
BILIRUB SERPL-MCNC: 0.4 MG/DL
BUN SERPL-MCNC: 8 MG/DL (ref 6–20)
CALCIUM SERPL-MCNC: 9.3 MG/DL (ref 8.8–10.4)
CHLORIDE SERPL-SCNC: 102 MMOL/L (ref 98–107)
CREAT SERPL-MCNC: 0.65 MG/DL (ref 0.67–1.17)
EGFRCR SERPLBLD CKD-EPI 2021: >90 ML/MIN/1.73M2
ERYTHROCYTE [DISTWIDTH] IN BLOOD BY AUTOMATED COUNT: 12.3 % (ref 10–15)
GLUCOSE SERPL-MCNC: 102 MG/DL (ref 70–99)
HCO3 SERPL-SCNC: 27 MMOL/L (ref 22–29)
HCT VFR BLD AUTO: 36.8 % (ref 40–53)
HGB BLD-MCNC: 13.1 G/DL (ref 13.3–17.7)
MAGNESIUM SERPL-MCNC: 1.9 MG/DL (ref 1.7–2.3)
MCH RBC QN AUTO: 32 PG (ref 26.5–33)
MCHC RBC AUTO-ENTMCNC: 35.6 G/DL (ref 31.5–36.5)
MCV RBC AUTO: 90 FL (ref 78–100)
PLATELET # BLD AUTO: 250 10E3/UL (ref 150–450)
POTASSIUM SERPL-SCNC: 4.1 MMOL/L (ref 3.4–5.3)
PROT SERPL-MCNC: 7.4 G/DL (ref 6.4–8.3)
RBC # BLD AUTO: 4.09 10E6/UL (ref 4.4–5.9)
SODIUM SERPL-SCNC: 139 MMOL/L (ref 135–145)
WBC # BLD AUTO: 5.8 10E3/UL (ref 4–11)

## 2025-02-17 PROCEDURE — 250N000013 HC RX MED GY IP 250 OP 250 PS 637: Performed by: INTERNAL MEDICINE

## 2025-02-17 PROCEDURE — 36415 COLL VENOUS BLD VENIPUNCTURE: CPT | Performed by: INTERNAL MEDICINE

## 2025-02-17 PROCEDURE — 83735 ASSAY OF MAGNESIUM: CPT | Performed by: INTERNAL MEDICINE

## 2025-02-17 PROCEDURE — 97530 THERAPEUTIC ACTIVITIES: CPT | Mod: GP

## 2025-02-17 PROCEDURE — 97116 GAIT TRAINING THERAPY: CPT | Mod: GP

## 2025-02-17 PROCEDURE — 82435 ASSAY OF BLOOD CHLORIDE: CPT | Performed by: INTERNAL MEDICINE

## 2025-02-17 PROCEDURE — 84155 ASSAY OF PROTEIN SERUM: CPT | Performed by: INTERNAL MEDICINE

## 2025-02-17 PROCEDURE — 85041 AUTOMATED RBC COUNT: CPT | Performed by: INTERNAL MEDICINE

## 2025-02-17 PROCEDURE — 97535 SELF CARE MNGMENT TRAINING: CPT | Mod: GO | Performed by: OCCUPATIONAL THERAPIST

## 2025-02-17 PROCEDURE — 250N000011 HC RX IP 250 OP 636: Performed by: INTERNAL MEDICINE

## 2025-02-17 PROCEDURE — 85027 COMPLETE CBC AUTOMATED: CPT | Performed by: INTERNAL MEDICINE

## 2025-02-17 PROCEDURE — 82040 ASSAY OF SERUM ALBUMIN: CPT | Performed by: INTERNAL MEDICINE

## 2025-02-17 PROCEDURE — 99232 SBSQ HOSP IP/OBS MODERATE 35: CPT | Performed by: INTERNAL MEDICINE

## 2025-02-17 PROCEDURE — 120N000001 HC R&B MED SURG/OB

## 2025-02-17 PROCEDURE — 70450 CT HEAD/BRAIN W/O DYE: CPT

## 2025-02-17 RX ORDER — LORAZEPAM 1 MG/1
1 TABLET ORAL EVERY 4 HOURS PRN
Status: DISCONTINUED | OUTPATIENT
Start: 2025-02-17 | End: 2025-02-19 | Stop reason: HOSPADM

## 2025-02-17 RX ORDER — ENOXAPARIN SODIUM 100 MG/ML
40 INJECTION SUBCUTANEOUS DAILY
Status: DISCONTINUED | OUTPATIENT
Start: 2025-02-17 | End: 2025-02-19 | Stop reason: HOSPADM

## 2025-02-17 RX ORDER — QUETIAPINE FUMARATE 25 MG/1
50 TABLET, FILM COATED ORAL AT BEDTIME
Status: DISCONTINUED | OUTPATIENT
Start: 2025-02-17 | End: 2025-02-19 | Stop reason: HOSPADM

## 2025-02-17 RX ADMIN — NICOTINE 1 PATCH: 21 PATCH, EXTENDED RELEASE TRANSDERMAL at 12:52

## 2025-02-17 RX ADMIN — OXYCODONE HYDROCHLORIDE 5 MG: 5 TABLET ORAL at 12:36

## 2025-02-17 RX ADMIN — QUETIAPINE FUMARATE 50 MG: 25 TABLET ORAL at 17:20

## 2025-02-17 RX ADMIN — LORAZEPAM 1 MG: 1 TABLET ORAL at 11:24

## 2025-02-17 RX ADMIN — OXYCODONE HYDROCHLORIDE 5 MG: 5 TABLET ORAL at 04:10

## 2025-02-17 RX ADMIN — HEPARIN SODIUM 5000 UNITS: 5000 INJECTION, SOLUTION INTRAVENOUS; SUBCUTANEOUS at 07:46

## 2025-02-17 RX ADMIN — ENOXAPARIN SODIUM 40 MG: 40 INJECTION SUBCUTANEOUS at 13:00

## 2025-02-17 RX ADMIN — ACETAMINOPHEN 650 MG: 325 TABLET, FILM COATED ORAL at 11:24

## 2025-02-17 RX ADMIN — LORAZEPAM 1 MG: 2 INJECTION INTRAMUSCULAR; INTRAVENOUS at 02:55

## 2025-02-17 RX ADMIN — GABAPENTIN 300 MG: 300 CAPSULE ORAL at 13:00

## 2025-02-17 RX ADMIN — GABAPENTIN 300 MG: 300 CAPSULE ORAL at 06:37

## 2025-02-17 RX ADMIN — LORAZEPAM 1 MG: 1 TABLET ORAL at 17:20

## 2025-02-17 RX ADMIN — OXYCODONE HYDROCHLORIDE 5 MG: 5 TABLET ORAL at 17:40

## 2025-02-17 ASSESSMENT — ACTIVITIES OF DAILY LIVING (ADL)
ADLS_ACUITY_SCORE: 40
ADLS_ACUITY_SCORE: 39
ADLS_ACUITY_SCORE: 40
ADLS_ACUITY_SCORE: 39
ADLS_ACUITY_SCORE: 40
ADLS_ACUITY_SCORE: 39
ADLS_ACUITY_SCORE: 40

## 2025-02-17 NOTE — PROGRESS NOTES
Patient continues to have anxiety and hallucinate, talking to people in his room that are not there, thinking that there has been visitors here today, thinking he is discharging today etc. Patient has been pleasant however. PRN ativan given and night dose of seroquel given early per provider.

## 2025-02-17 NOTE — CARE PLAN
"Bed alarm was alarming. Patient found on floor on knees by room sink at 0311. Patient stated a couple of different reasons why he was on the floor stating  \"I was sitting on the edge of the bed doing exercises and I slipped then I crawled over to the door\" and \"I slipped out of bed and figured I would crawl to the door to go on a walk with you\"     Skin check performed, new skin concern noted abrasion to L side of ribs, patient expressed that it is from scratching his skin not from the fall. Abrasion covered with Allevyn. Patient denies hitting head and denies any new pain c/o pain to bilateral feet denies PRN pain medication.     BP (!) 147/84 (BP Location: Right arm, Patient Position: Semi-Hendrickson's, Cuff Size: Adult Regular)   Pulse 86   Temp 98  F (36.7  C) (Tympanic)   Resp 18   Ht 1.93 m (6' 4\")   Wt 84.2 kg (185 lb 10 oz)   SpO2 100%   BMI 22.60 kg/m       Charge nurse, house supervisor and Tele MD Dr. Melissa Calabrese made aware.     Dr. Melissa Calabrese ordered Head CT, patient refused head CT and stated that he did not hit his head. Dr. Melissa Calabrese made aware of head CT refusal, re-approached patient on head CT and patient was agreeable.     Patient moved to room 301. Bed alarm on and call light in reach. Patient was apologetic about falling.    Cyn Perdue RN 2/17/2025 4:32 AM         "

## 2025-02-17 NOTE — PROGRESS NOTES
SAFETY CHECKLIST  ID Bands and Risk clasps correct and in place (DNR, Fall risk, Allergy, Latex, Limb):  Yes  All Lines Reconciled and labeled correctly: Yes  Whiteboard updated:Yes  Environmental interventions: Yes  Verify Tele #:      Cyn Perdue RN 2/16/2025 9:56 PM

## 2025-02-17 NOTE — PROGRESS NOTES
"Patient becoming belligerent and having increased auditory and visual disturbances. Stating \"The Native mob is holding my  at gun point. I need you guys to get those people out of the auditory room because they are talking to me from there.\"  Patient pointed at corner of room window. Patient called 911 and informed them what he had reported to writer. Writer was unaware that patient had called 911 until officers showed up. Patient given to another nurse due to writer \"Not being able to protect him from people coming into his room at night.\"  "

## 2025-02-17 NOTE — PLAN OF CARE
"Goal Outcome Evaluation: Patients vss. /76 (BP Location: Left arm, Patient Position: Semi-Hendrickson's, Cuff Size: Adult Regular)   Pulse 96   Temp 97.2  F (36.2  C) (Tympanic)   Resp 16   Ht 1.93 m (6' 4\")   Wt 84.2 kg (185 lb 10 oz)   SpO2 97%   BMI 22.60 kg/m   Patient appears to be compulsive, distrusting/suspicious of staff but is appreciative of help. Patient reports 6/10 burning pain to bilateral feet. Declines any pain intervention besides SCD's on. States this helps with the pain. Patient refusing to let nurse change dressings to feet, states he wants to wait for the surgeon to look at them today. Patient refusing skin assessment at this time as well.                         "

## 2025-02-17 NOTE — PROGRESS NOTES
"Patient refused VS check and morning assessment. Patient stated \"No I just want to sleep.\"    Cyn Perdue RN 2/17/2025 1:55 AM      Patient later called and stated that RN can take VS and do assessment.     Cyn Perdue RN 2/17/2025 2:32 AM    "

## 2025-02-17 NOTE — PROGRESS NOTES
Patient is sleeping comfortably in bed with call light in reach, bed in lowest position, and bed alarm on.     Cyn Perdue RN 2/16/2025 10:21 PM

## 2025-02-17 NOTE — PROGRESS NOTES
02/17/25 1233   Appointment Info   Signing Clinician's Name / Credentials (OT) Eun Martinez, OTR/L   Self-Care/Home Management   Self-Care/Home Mgmt/ADL, Compensatory, Meal Prep Minutes (63430) 30   Snyder Level (Grooming Training) stand-by assist   Assistance (Grooming Training) set-up required   Snyder Level (Bathing Training) stand-by assist   Assistance (Bathing Training) supervision;set-up required  (while seated in rolling shower chair)   Snyder Level (Upper Body Dressing Training) stand-by assist   Assistance (Upper Body Dressing Training) supervision;set-up required   Lower Body Dressing Training Assistance stand-by assist   Lower Body Dressing Training Assistance supervision   OT Discharge Planning   OT Plan Uncertain disposition at this time   OT Discharge Recommendation (DC Rec)   (Pt would benefit from going somewhere that can provide assist for self cares as needed, pt is interested in treatment, would benefit from STR)   OT Rationale for DC Rec Pt is not able to tolerate weight bearing on his feet, would benefit from assist as able   OT Brief overview of current status Pt completed full shower today with SBA only while seated in rolling shower chair. Pt was able to transfer from bed to rolling shower chair with CGA for safety and slight impulsivity, moves quickly. Pt able to dress self while seated and in bed with SBA only   Total Session Time   Timed Code Treatment Minutes 30   Total Session Time (sum of timed and untimed services) 30

## 2025-02-17 NOTE — PLAN OF CARE
Goal Outcome Evaluation:    BP elevated, VSS otherwise, pt c/o pain to bilateral feet got PRN oxycodone- effective. Patient c/o having anxiety PRN ativan given was effective.     Patient was found on the floor kneeling. See other note regarding this.      Bilateral feet dressings CDI. 2 Allevyn in place to bilateral buttock for peeling on bilateral buttock.  Abrasion to L ribs covered with Allevyn.     Bilateral LE elevated with pillows through out the night.     Intermittent agitation and impulsive, attempting to self transfer, not call light appropriate. Bed alarm on and call light in reach.         Plan of Care Reviewed With: patient    Overall Patient Progress: no changeOverall Patient Progress: no change    Outcome Evaluation: BP elevated VSS otherwise pt c/o pain to bilateral feet declines PRN pain meds pt c/o feel anxious PRN ativan given    Cyn Perdue RN 2/17/2025 6:53 AM

## 2025-02-17 NOTE — PROGRESS NOTES
[facility] PROGRESS NOTE    Assessment:     ICD-10-CM    1. Frostbite, initial encounter  T33.90XA       2. Cold exposure, initial encounter  T69.9XXA       3. Methamphetamine intoxication (H)  F15.929           Plan:   Continue with treatment plan as currently prescribed.  Wrap in between and around toes and over all areas of frostbite injury with Vaseline gauze.  Apply-9 ABD dressings to on left foot and 3 on right foot and secure with gauze roll and Medipore tape.  Follow up in wound clinic within 5 days of discharge from hospital.  Monitor closely for any s/sx of wound worsening or evidence of infection as reviewed and discussed at visit.  Follow up urgently with any worsening or infection concerns.    Subjective:  Patient seen today to follow up on frostbite injury of both feet.    He has been followed by surgeon.  No surgical debridement planned at this time.  No evidence of demarcation.  He has been doing some ambulation.  He tells me that he lives a rough lifestyle.  He has had history of alcohol and polysubstance abuse addiction.  He wants to change his life and do better but he does not want to go to any chemical dependency treatment facilities in the area.  He has family on the [facility].   has been discussing outpatient treatment options.    No past medical history on file.  No past surgical history on file.  Patient has no known allergies.  Current Facility-Administered Medications   Medication Dose Route Frequency Provider Last Rate Last Admin    acetaminophen (TYLENOL) tablet 650 mg  650 mg Oral Q4H PRN Maxx Hwang MD   650 mg at 02/17/25 1124    Or    acetaminophen (TYLENOL) Suppository 650 mg  650 mg Rectal Q4H PRN Maxx Hwang MD        alum & mag hydroxide-simethicone (MAALOX) suspension 30 mL  30 mL Oral Q4H PRN Maxx Hwang MD        calcium carbonate (TUMS) chewable tablet 1,000 mg  1,000 mg Oral 4x Daily PRN Maxx Hwang MD        enoxaparin  ANTICOAGULANT (LOVENOX) injection 40 mg  40 mg Subcutaneous Daily Ed Gordillo MD        gabapentin (NEURONTIN) capsule 300 mg  300 mg Oral TID Ed Gordillo MD   300 mg at 02/17/25 0637    haloperidol lactate (HALDOL) injection 1 mg  1 mg Intravenous Once PRN Melissa Calabrese MD        lidocaine (LMX4) cream   Topical Q1H PRN Maxx Hwang MD        lidocaine 1 % 0.1-1 mL  0.1-1 mL Other Q1H PRN Maxx Hwang MD        LORazepam (ATIVAN) tablet 1 mg  1 mg Oral Q4H PRN Ed Gordillo MD   1 mg at 02/17/25 1124    naloxone (NARCAN) injection 0.2 mg  0.2 mg Intravenous Q2 Min PRN Maxx Hwang MD        Or    naloxone (NARCAN) injection 0.4 mg  0.4 mg Intravenous Q2 Min PRN Maxx Hwang MD        Or    naloxone (NARCAN) injection 0.2 mg  0.2 mg Intramuscular Q2 Min PRN Maxx Hwang MD        Or    naloxone (NARCAN) injection 0.4 mg  0.4 mg Intramuscular Q2 Min PRN Maxx Hwang MD        nicotine (NICODERM CQ) 21 MG/24HR 24 hr patch 1 patch  1 patch Transdermal Daily Ed Gordillo MD   1 patch at 02/17/25 1252    ondansetron (ZOFRAN ODT) ODT tab 4 mg  4 mg Oral Q6H PRN Maxx Hwang MD        Or    ondansetron (ZOFRAN) injection 4 mg  4 mg Intravenous Q6H PRN Maxx Hwang MD        oxyCODONE (ROXICODONE) tablet 5 mg  5 mg Oral Q4H PRN Ed Gordillo MD   5 mg at 02/17/25 1236    polyethylene glycol (MIRALAX) Packet 17 g  17 g Oral BID PRN Maxx Hwang MD        prochlorperazine (COMPAZINE) injection 10 mg  10 mg Intravenous Q6H PRN Maxx Hwang MD        Or    prochlorperazine (COMPAZINE) tablet 10 mg  10 mg Oral Q6H PRN Maxx Hwang MD        QUEtiapine (SEROquel) tablet 50 mg  50 mg Oral At Bedtime Ed Gordillo MD        sendayanara-docusate (SENOKOT-S/PERICOLACE) 8.6-50 MG per tablet 1 tablet  1 tablet Oral BID PRN Maxx Hwang MD        Or    senna-docusate (SENOKOT-S/PERICOLACE) 8.6-50 MG per tablet 2 tablet  2 tablet Oral BID PRMaxx Estrada  "MD JUDY        sodium chloride (PF) 0.9% PF flush 3 mL  3 mL Intracatheter Q8H Maxx Hwang MD   3 mL at 02/17/25 0636    sodium chloride (PF) 0.9% PF flush 3 mL  3 mL Intracatheter q1 min prn Maxx Hwang MD   3 mL at 02/15/25 1805       Review of Systems:  No fever or chills.    Objective:   /76 (BP Location: Left arm, Patient Position: Semi-Hendrickson's, Cuff Size: Adult Regular)   Pulse 96   Temp 97.2  F (36.2  C) (Tympanic)   Resp 16   Ht 1.93 m (6' 4\")   Wt 84.2 kg (185 lb 10 oz)   SpO2 97%   BMI 22.60 kg/m    Physical Exam     Pleasant gentleman who does become tearful at times during conversation.  He otherwise is alert and oriented.  Toes and forefoot of both feet and right medial and lateral foot are blue.  Skin still intact as when patient was evaluated by this provider last Friday.  He has the ruptured blister along the left heel.  Pink wound bed.  Affected areas are all cleansed with Anasept.  Vaseline gauze is threaded in between toes and laid over toes, bilateral forefoot and medial lateral aspects of right foot and heel and Achilles region of left foot.  Three 5 x 9 ABD pads placed over dressings on right foot and two 5 x 9 ABD pads on left foot.  Gauze roll used to secure dressings in place.      BHARATH Szymanski    "

## 2025-02-17 NOTE — PROGRESS NOTES
:     Spoke with patient in room about discharge planning. Patient is unsure on what he wants to do once he is discharged.  will continue to follow for discharge planning needs.     GE Atkinson on 2/17/2025 at 10:15 AM    Patient now states he would like to go to chemical dependency inpatient treatment. He would be agreeable to [facility] and [facility]. Left a voicemail at the [facility] and faxed a referral to [facility]. Also left voicemail at [facility].     GE Atkinson on 2/17/2025 at 11:20 AM    Left voicemail with [facility] in [place].     GE Atkinson on 2/17/2025 at 12:39 PM    Faxed referral to [facility]. They will screen patient.     GE Atkinson on 2/17/2025 at 3:04 PM

## 2025-02-17 NOTE — PROGRESS NOTES
[facility]    Hospitalist Progress Note      Assessment & Plan   Pablo Gonzalez is a 29 year old male who was admitted on 2/13/2025.     Clinically Significant Risk Factors          # Hypochloremia: Lowest Cl = 95 mmol/L in last 2 days, will monitor as appropriate                              Principal Problem:    Frostbite, initial encounter    Cold exposure, initial encounter    Assessment: Ongoing stability.  No evidence of bacterial coinfection.  Occurred to his bilateral feet 4 days prior to admission.  On admit ED provider initially contacted burn unit and it was felt he was too late in the course regarding further interventions.  Continuing with aggressive supportive cares.    Plan:   -Appreciate WOC consult  -Appreciate general surgery consult daily for debridement needs  -PO pain control today with prn oxycodone and scheduled Neurontin  -ongoing PT/OT services for early ambulation and weightbearing as tolerated per general surgery  -Appreciate social work involvement for dispo needs      Methamphetamine intoxication (H)    Polysubstance abuse (H)    Tobacco abuse    Assessment: Stable, initially started on Precedex drip on admit.  Intermittent hallucinations at night, appropriate but emotionally labile, has remained very appreciative of care.      Plan:   -Ativan as needed  -Encephalopathy order set placed  -Schedule Seroquel nightly   -nicotine patch    Diet: Combination Diet Regular Diet Adult    DVT Prophylaxis: Heparin SQ  Holt Catheter: Not present  Code Status: Full Code           Disposition Plan      Entered: Ed Gordillo MD 02/17/2025, 10:44 AM       The patient's care was discussed with the Bedside Nurse and Patient     Ed Gordillo MD  Hospitalist Service  [facility]  Contact information available via [facility] Paging/Directory    ______________________________________________________________________    Interval History   CC: Frostbite    Overnight no acute  events and afebrile, pain is well-controlled, he does not want any more IV pain medicines as he does not want to get addicted to pain medicine.  Intermittently tearful but very grateful that he is not requiring more urgent amputations of his toes, shortness of breath cough urinary difficulty, ambulating some, he denies hitting his head last night he does not think that he fell.    -Data reviewed today: I reviewed all new labs and imaging results over the last 24 hours.     Physical Exam   Temp: 97.2  F (36.2  C) Temp src: Tympanic BP: 131/76 Pulse: 96   Resp: 16 SpO2: 97 % O2 Device: None (Room air)    Vitals:    02/13/25 1122 02/13/25 1539 02/15/25 0539   Weight: 95.3 kg (210 lb) 84.5 kg (186 lb 4.6 oz) 84.2 kg (185 lb 10 oz)     Vital Signs with Ranges  Temp:  [97.2  F (36.2  C)-99.1  F (37.3  C)] 97.2  F (36.2  C)  Pulse:  [73-96] 96  Resp:  [16-20] 16  BP: (128-148)/(76-84) 131/76  SpO2:  [97 %-100 %] 97 %  I/O last 3 completed shifts:  In: 600 [P.O.:600]  Out: 1000 [Urine:1000]    GENERAL: Talkative, sitting up in bed, in no apparent distress.  HEENT: No ecchymoses to his face or scalp.  CARDIOVASCULAR: regular rate and rhythm, no murmurs, rubs, or gallops. Normal S1/S2. No lower extremity edema.   RESPIRATORY: clear to auscultation bilaterally, no wheezes, no crackles.   GI: soft, non-tender, non-distended, normoactive bowel sounds.  MUSCULOSKELETAL: warm and well perfused, 2+ dorsalis pedis pulses bilaterally.    SKIN: Bilateral feet wrapped with bulky Kerlix dressings clean dry and intact.  Neuro: Awake alert and oriented x 3, no upper extremity tremor, appropriate tracking and pleasant.    Medications   Current Facility-Administered Medications   Medication Dose Route Frequency Provider Last Rate Last Admin     Current Facility-Administered Medications   Medication Dose Route Frequency Provider Last Rate Last Admin    gabapentin (NEURONTIN) capsule 300 mg  300 mg Oral TID Ed Gordillo MD   300 mg at  02/17/25 0637    heparin ANTICOAGULANT injection 5,000 Units  5,000 Units Subcutaneous Q8H Maxx Hwang MD   5,000 Units at 02/17/25 0746    nicotine (NICODERM CQ) 21 MG/24HR 24 hr patch 1 patch  1 patch Transdermal Daily Ed Gordillo MD   1 patch at 02/16/25 1527    QUEtiapine (SEROquel) tablet 50 mg  50 mg Oral At Bedtime Ed Gordillo MD        sodium chloride (PF) 0.9% PF flush 3 mL  3 mL Intracatheter Q8H Maxx Hwang MD   3 mL at 02/17/25 0636       Data   Recent Labs   Lab 02/17/25  0639 02/16/25  1633 02/15/25  0519 02/14/25  0549 02/13/25  1109   WBC 5.8  --   --  9.0 14.6*   HGB 13.1*  --   --  12.3* 14.6   MCV 90  --   --  90 87     --   --  198 257    135 139 134* 130*   POTASSIUM 4.1 3.6 3.8 3.9 4.2   CHLORIDE 102 95* 100 96* 87*   CO2 27 30* 28 24 19*   BUN 8.0 9.3 9.3 25.9* 40.1*   CR 0.65* 0.67 0.57* 0.77 1.31*   ANIONGAP 10 10 11 14 24*   DA 9.3 9.3 8.8 8.6* 9.4   * 114* 99 108* 99   ALBUMIN 4.0  --   --  3.8 4.9   PROTTOTAL 7.4  --   --  6.7 8.5*   BILITOTAL 0.4  --   --  1.3* 1.8*   ALKPHOS 60  --   --  62 80   ALT 67  --   --  72* 88*   AST 75*  --   --  162* 244*   LIPASE  --   --   --   --  13       Recent Results (from the past 24 hours)   CT Head w/o contrast*    Narrative    EXAM: CT HEAD W/O CONTRAST  LOCATION: [facility]  DATE: 2/17/2025    INDICATION: Traumatic injury  COMPARISON: None.  TECHNIQUE: Routine CT Head without IV contrast. Multiplanar reformats. Dose reduction techniques were used.    FINDINGS:  INTRACRANIAL CONTENTS: No intracranial hemorrhage, extraaxial collection, or mass effect.  No CT evidence of acute infarct. Normal parenchymal attenuation. Normal ventricles and sulci.     VISUALIZED ORBITS/SINUSES/MASTOIDS: No intraorbital abnormality. No paranasal sinus mucosal disease. No middle ear or mastoid effusion.    BONES/SOFT TISSUES: No acute abnormality.      Impression    IMPRESSION:  1.  No acute intracranial  process.

## 2025-02-17 NOTE — PROGRESS NOTES
02/17/25 1232   Appointment Info   Signing Clinician's Name / Credentials (PT) Daniel Ma MPT   Therapeutic Activity   Symptoms Noted During/After Treatment Increased pain   Treatment Detail/Skilled Intervention bed mobilities with SBA; surface<>surface transfers with CGA to SBA; patient was able to perform sit to stand with SBA;  PT assisted patient with shower today with set up   PT Discharge Planning   PT Plan Continue PT   PT Discharge Recommendation (DC Rec) Transitional Care Facility   PT Rationale for DC Rec Patient's current condition is complex and evolving. He requires assist with transfers to a wheelchair. He is currently not able to walk secondary to pain and wounds on feet.   PT Brief overview of current status Patient with limited gait mobility due to painful bilateral foot pain; good bed mobility demonstrated;  patient is able to perform chair<>bed transfers with CGA to SBA for safety   PT Equipment Needed at Discharge wheelchair;walker, rolling  (post op shoes to protect his dressings/feet)

## 2025-02-17 NOTE — PROGRESS NOTES
"Patient got himself dressed and states his family is coming to pick him up right now. Patient took out his IV. Educated patient on waiting for discharge orders and plan from  before leaving the hospital. Asked patient if I could do his dressing change, patient stated \"No I don't think so, I don't need surgery or anything so I'm just going to head out my family is impatient about getting me home\"   "

## 2025-02-17 NOTE — PROGRESS NOTES
Patient has calmed down after ativan administration. New IV placed. WOC nurse in to change dressings to bilateral feet.

## 2025-02-17 NOTE — PROGRESS NOTES
"Patient's platelet labs resulted, approached patient at 0650 to see if he would be willing to take his scheduled heparin injection at this time patient refused and stated \"I just want to sleep\" and stated that he would be willing to take later this morning. Day shift nurse aware.     Cyn Perdue RN 2/17/2025 7:25 AM    "

## 2025-02-17 NOTE — PROGRESS NOTES
Interdisciplinary Discharge Planning Note    Anticipated Discharge Date: 2/18-2/20    Anticipated Discharge Location: Treatment v home    Clinical Needs Before Discharge:   wound care    Treatment Needs After Discharge:   Chem dep treatment    Potential Barriers to Discharge: Patient is unsure what he wants to do at discharge    GE Atkinson  2/17/2025,  11:56 AM

## 2025-02-18 LAB
HOLD SPECIMEN: NORMAL
MAGNESIUM SERPL-MCNC: 1.9 MG/DL (ref 1.7–2.3)
POTASSIUM SERPL-SCNC: 3.7 MMOL/L (ref 3.4–5.3)

## 2025-02-18 PROCEDURE — 250N000013 HC RX MED GY IP 250 OP 250 PS 637: Performed by: INTERNAL MEDICINE

## 2025-02-18 PROCEDURE — 99233 SBSQ HOSP IP/OBS HIGH 50: CPT | Performed by: STUDENT IN AN ORGANIZED HEALTH CARE EDUCATION/TRAINING PROGRAM

## 2025-02-18 PROCEDURE — 250N000011 HC RX IP 250 OP 636: Performed by: INTERNAL MEDICINE

## 2025-02-18 PROCEDURE — 83735 ASSAY OF MAGNESIUM: CPT | Performed by: INTERNAL MEDICINE

## 2025-02-18 PROCEDURE — 120N000001 HC R&B MED SURG/OB

## 2025-02-18 PROCEDURE — 84132 ASSAY OF SERUM POTASSIUM: CPT | Performed by: INTERNAL MEDICINE

## 2025-02-18 PROCEDURE — 36415 COLL VENOUS BLD VENIPUNCTURE: CPT | Performed by: INTERNAL MEDICINE

## 2025-02-18 RX ADMIN — LORAZEPAM 1 MG: 1 TABLET ORAL at 18:21

## 2025-02-18 RX ADMIN — ENOXAPARIN SODIUM 40 MG: 40 INJECTION SUBCUTANEOUS at 10:57

## 2025-02-18 RX ADMIN — LORAZEPAM 1 MG: 1 TABLET ORAL at 22:39

## 2025-02-18 RX ADMIN — GABAPENTIN 300 MG: 300 CAPSULE ORAL at 19:46

## 2025-02-18 RX ADMIN — ACETAMINOPHEN 650 MG: 325 TABLET, FILM COATED ORAL at 10:19

## 2025-02-18 RX ADMIN — GABAPENTIN 300 MG: 300 CAPSULE ORAL at 13:31

## 2025-02-18 RX ADMIN — GABAPENTIN 300 MG: 300 CAPSULE ORAL at 05:18

## 2025-02-18 RX ADMIN — LORAZEPAM 1 MG: 1 TABLET ORAL at 10:56

## 2025-02-18 RX ADMIN — OXYCODONE HYDROCHLORIDE 5 MG: 5 TABLET ORAL at 22:39

## 2025-02-18 RX ADMIN — QUETIAPINE FUMARATE 50 MG: 25 TABLET ORAL at 18:21

## 2025-02-18 RX ADMIN — NICOTINE 1 PATCH: 21 PATCH, EXTENDED RELEASE TRANSDERMAL at 12:28

## 2025-02-18 RX ADMIN — OXYCODONE HYDROCHLORIDE 5 MG: 5 TABLET ORAL at 10:56

## 2025-02-18 RX ADMIN — OXYCODONE HYDROCHLORIDE 5 MG: 5 TABLET ORAL at 18:21

## 2025-02-18 ASSESSMENT — ACTIVITIES OF DAILY LIVING (ADL)
ADLS_ACUITY_SCORE: 39
ADLS_ACUITY_SCORE: 40
ADLS_ACUITY_SCORE: 40
ADLS_ACUITY_SCORE: 39
ADLS_ACUITY_SCORE: 40
ADLS_ACUITY_SCORE: 39
ADLS_ACUITY_SCORE: 39
ADLS_ACUITY_SCORE: 40
ADLS_ACUITY_SCORE: 40
ADLS_ACUITY_SCORE: 39
ADLS_ACUITY_SCORE: 40
ADLS_ACUITY_SCORE: 39
ADLS_ACUITY_SCORE: 40

## 2025-02-18 NOTE — PLAN OF CARE
"Goal Outcome Evaluation: Patients vss. /86 (BP Location: Left arm, Patient Position: Semi-Hendrickson's, Cuff Size: Adult Regular)   Pulse 72   Temp 98.7  F (37.1  C) (Tympanic)   Resp 16   Ht 1.93 m (6' 4\")   Wt 84.2 kg (185 lb 10 oz)   SpO2 98%   BMI 22.60 kg/m   Pain to bilateral feet. PRN oxycodone utilized, PRN ativan utilized for patient reports of anxiety. Patient is up wheeling the halls in his wheelchair, states he is going \"stir crazy\" in his room. Patient has been pleasant and appropriate so far this morning. Declining dressing change to feet, waiting for [facility] nurse to come.                         "
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
## 2025-02-18 NOTE — PLAN OF CARE
"A&O, VSS, no hallucinations this shift, slept most of the night, repeated requests this morning while awake otherwise pleasant, dressings to BLE CDI, utilizing wheel chair when out of bed, slept most of the night.     /79 (BP Location: Left arm, Patient Position: Semi-Hendrickson's, Cuff Size: Adult Regular)   Pulse 80   Temp 98.8  F (37.1  C) (Tympanic)   Resp 20   Ht 1.93 m (6' 4\")   Wt 84.2 kg (185 lb 10 oz)   SpO2 97%   BMI 22.60 kg/m      Heidi Cope RN on 2/18/2025 at 5:35 AM    "

## 2025-02-18 NOTE — PROGRESS NOTES
Interdisciplinary Discharge Planning Note    Anticipated Discharge Date: Ready     Anticipated Discharge Location: Unknown    Clinical Needs Before Discharge:   medically ready for discharge    Treatment Needs After Discharge:   treatment    Potential Barriers to Discharge: Patient is currently homeless    GE Atkinson  2/18/2025,  12:23 PM

## 2025-02-18 NOTE — PROGRESS NOTES
Dressing change completed. Xeroform applied to right foot, petroleum gauze strips to left foot as xeroform was out of stock. Patient tolerated dressing change.
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
## 2025-02-19 ENCOUNTER — APPOINTMENT (OUTPATIENT)
Dept: PHYSICAL THERAPY | Facility: OTHER | Age: 30
End: 2025-02-19
Payer: MEDICAID

## 2025-02-19 VITALS
HEART RATE: 89 BPM | SYSTOLIC BLOOD PRESSURE: 130 MMHG | TEMPERATURE: 97.7 F | OXYGEN SATURATION: 97 % | RESPIRATION RATE: 16 BRPM | WEIGHT: 185.63 LBS | DIASTOLIC BLOOD PRESSURE: 78 MMHG | BODY MASS INDEX: 22.6 KG/M2 | HEIGHT: 76 IN

## 2025-02-19 PROCEDURE — 250N000013 HC RX MED GY IP 250 OP 250 PS 637: Performed by: INTERNAL MEDICINE

## 2025-02-19 PROCEDURE — 99239 HOSP IP/OBS DSCHRG MGMT >30: CPT | Performed by: STUDENT IN AN ORGANIZED HEALTH CARE EDUCATION/TRAINING PROGRAM

## 2025-02-19 PROCEDURE — 250N000011 HC RX IP 250 OP 636: Performed by: INTERNAL MEDICINE

## 2025-02-19 PROCEDURE — 250N000013 HC RX MED GY IP 250 OP 250 PS 637: Performed by: STUDENT IN AN ORGANIZED HEALTH CARE EDUCATION/TRAINING PROGRAM

## 2025-02-19 RX ORDER — ACETAMINOPHEN 500 MG
1000 TABLET ORAL 4 TIMES DAILY PRN
Qty: 250 TABLET | Refills: 0 | Status: SHIPPED | OUTPATIENT
Start: 2025-02-19

## 2025-02-19 RX ORDER — HYDROMORPHONE HYDROCHLORIDE 2 MG/1
2-4 TABLET ORAL
Status: DISCONTINUED | OUTPATIENT
Start: 2025-02-19 | End: 2025-02-19 | Stop reason: HOSPADM

## 2025-02-19 RX ORDER — HYDROMORPHONE HYDROCHLORIDE 4 MG/1
4 TABLET ORAL ONCE
Status: COMPLETED | OUTPATIENT
Start: 2025-02-19 | End: 2025-02-19

## 2025-02-19 RX ORDER — HYDROMORPHONE HYDROCHLORIDE 2 MG/1
2 TABLET ORAL EVERY 6 HOURS PRN
Qty: 12 TABLET | Refills: 0 | Status: SHIPPED | OUTPATIENT
Start: 2025-02-19 | End: 2025-02-22

## 2025-02-19 RX ORDER — ACETAMINOPHEN 500 MG
1000 TABLET ORAL 4 TIMES DAILY PRN
Qty: 250 TABLET | Refills: 0 | Status: SHIPPED | OUTPATIENT
Start: 2025-02-19 | End: 2025-02-19

## 2025-02-19 RX ORDER — ACETAMINOPHEN 500 MG
1000 TABLET ORAL 4 TIMES DAILY
Status: DISCONTINUED | OUTPATIENT
Start: 2025-02-19 | End: 2025-02-19 | Stop reason: HOSPADM

## 2025-02-19 RX ADMIN — HYDROMORPHONE HYDROCHLORIDE 2 MG: 2 TABLET ORAL at 14:05

## 2025-02-19 RX ADMIN — ENOXAPARIN SODIUM 40 MG: 40 INJECTION SUBCUTANEOUS at 10:56

## 2025-02-19 RX ADMIN — ACETAMINOPHEN 650 MG: 325 TABLET, FILM COATED ORAL at 07:57

## 2025-02-19 RX ADMIN — GABAPENTIN 300 MG: 300 CAPSULE ORAL at 07:59

## 2025-02-19 RX ADMIN — GABAPENTIN 300 MG: 300 CAPSULE ORAL at 13:21

## 2025-02-19 RX ADMIN — HYDROMORPHONE HYDROCHLORIDE 4 MG: 4 TABLET ORAL at 10:55

## 2025-02-19 RX ADMIN — ACETAMINOPHEN 1000 MG: 500 TABLET, FILM COATED ORAL at 12:26

## 2025-02-19 RX ADMIN — LORAZEPAM 1 MG: 1 TABLET ORAL at 09:26

## 2025-02-19 RX ADMIN — OXYCODONE HYDROCHLORIDE 5 MG: 5 TABLET ORAL at 09:26

## 2025-02-19 ASSESSMENT — ACTIVITIES OF DAILY LIVING (ADL)
ADLS_ACUITY_SCORE: 40
ADLS_ACUITY_SCORE: 39
ADLS_ACUITY_SCORE: 40

## 2025-02-19 NOTE — PROGRESS NOTES
"D/I/A: Pt up in wheelchair states \"getting my things together and waiting for a ride.\"  Reviewed with pt they do not have discharge orders and would need to wait until morning for discharge plan from MD.  Pt states \"the doctor discharged me this morning and to look at the social work noted.\"  A+O x4; minor to moderate anxiety and frustration noted.  No reference to plan to discharge pt this evening in MD note.  Handoff of information to bedside nurse given.  P: Continue to monitor pt situation and behaviors.  Maintain pt and staff safety.  "

## 2025-02-19 NOTE — PROGRESS NOTES
[facility] DISCHARGE NOTE    Patient discharged to [facility] at 2:47 PM via wheel chair. Accompanied by and staff. Discharge instructions reviewed with patient, opportunity offered to ask questions. Prescriptions sent to patients preferred pharmacy. All belongings sent with patient.    Patito Hathaway RN
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
## 2025-02-19 NOTE — PLAN OF CARE
"/78 (BP Location: Left arm, Patient Position: Semi-Hendrickson's, Cuff Size: Adult Regular)   Pulse 89   Temp 97.7  F (36.5  C) (Tympanic)   Resp 16   Ht 1.93 m (6' 4\")   Wt 84.2 kg (185 lb 10 oz)   SpO2 97%   BMI 22.60 kg/m      Pt A&O, VSS, afebrile, rates pain 8/10. Scheduled tylenol and PRN dilaudid given with some relief. On room air, LS clear. New dressings applied to patients feet bilaterally. Instructed patient on steps of dressing changes and sent supplies with patient. IV removed.   "

## 2025-02-19 NOTE — PROGRESS NOTES
:     Spoke with patient in the room and patient stated he would like to leave today and doesn't get why his family hasn't picked him up yet. He was speaking with Nayeli, , but couldn't answer the questions to fill out the MA application. Patient provided a couple of numbers to reach out to. Patient was not agreeable to sign ROIs for [facility], [facility] or [facility].    Spoke with staff at [facility]. Patient is not registered or connected to any services through them. Patient would need to establish residency in the [facility] to start the process to connect with them. They stated that once patient is in the area he can come to their facility to get connected with services.     Called numbers patient provided and left voicemail. Once called back and stated they do not know patient and it ws the wrong number.     Called [facility]. They have 2 male beds available for this evening. Beds open at 16:00 and they can not be held. Therefore, patient will need to be there right at 16:00 to claim a bed.     [employer] is able to transport patient to [facility]. They will pick patient up today at 14:55. The fee will be $217.50 which [facility] will cover and was approved by Iva Gregorio.     Patient will be discharged with wound care education and supplies. PT will provide patient with medical boots. Pharmacy will fill pain prescription here at [facility] and he will be sent with him.     GE Atkinson on 2/19/2025 at 11:24 AM    Created a document with resources in the area of where patient will be going. Provide this to patient and placed one in his discharge packet. Patient was very grateful and thankful for the care he has received here but is excited to be discharging.     No further needs from  at this time.     GE Atkinson on 2/19/2025 at 12:48 PM

## 2025-02-19 NOTE — PLAN OF CARE
"See previous note regarding behavior.   A&O, VSS, patient slept through night once PRN ativan and oxycodone were administered. Patient not woken through night for assessment per order.    BP (!) 146/83 (BP Location: Left arm, Patient Position: Semi-Hendrickson's, Cuff Size: Adult Regular)   Pulse 66   Temp 97.3  F (36.3  C) (Tympanic)   Resp 18   Ht 1.93 m (6' 4\")   Wt 84.2 kg (185 lb 10 oz)   SpO2 100%   BMI 22.60 kg/m      Heidi Cope RN on 2/19/2025 at 6:11 AM      "

## 2025-02-19 NOTE — PHARMACY - DISCHARGE MEDICATION RECONCILIATION AND EDUCATION
Pharmacy:  Discharge Counseling and Medication Reconciliation    Pablo Gonzalez  [address]  [facility] [address]  [phone] (home)   29 year old male  PCP: No Ref-Primary, Physician    Allergies: Patient has no known allergies.    Discharge Counseling:    Pharmacist met with patient (and/or family) today to review the medication portion of the After Visit Summary (with an emphasis on NEW medications) and to address patient's questions/concerns.    Summary of Education:  Hydromorphone: Reviewed using this sparingly for pain, reviewed risk of drowsiness  APAP: Encouraged using this for pain- reviewed two tablets 4 times daily is the maximum dosing and to not exceed this.    Materials Provided:  MedCounselor sheets printed from Clinical Pharmacology on: Hydromorphone    Discharge Medication Reconciliation:    It has been determined that the patient has an adequate supply of medications available or which can be obtained from the patient's preferred pharmacy, which HE/SHE has confirmed as: [facility]    Thank you for the consult.    Haylee Plaza RPH........February 19, 2025 1:09 PM

## 2025-02-20 ENCOUNTER — PATIENT OUTREACH (OUTPATIENT)
Dept: FAMILY MEDICINE | Facility: OTHER | Age: 30
End: 2025-02-20
Payer: MEDICAID

## 2025-02-20 NOTE — TELEPHONE ENCOUNTER
Patient has PCP elsewhere, no follow-up here. No TCM call required per policy.  Liane Lund RN on 2/20/2025 at 7:46 AM
